# Patient Record
Sex: FEMALE | Race: OTHER | Employment: OTHER | URBAN - METROPOLITAN AREA
[De-identification: names, ages, dates, MRNs, and addresses within clinical notes are randomized per-mention and may not be internally consistent; named-entity substitution may affect disease eponyms.]

---

## 2017-04-02 ENCOUNTER — HOSPITAL ENCOUNTER (EMERGENCY)
Facility: HOSPITAL | Age: 77
Discharge: HOME | End: 2017-04-02
Attending: EMERGENCY MEDICINE
Payer: MEDICARE

## 2017-04-02 ENCOUNTER — APPOINTMENT (OUTPATIENT)
Dept: CT IMAGING | Facility: HOSPITAL | Age: 77
End: 2017-04-02
Attending: EMERGENCY MEDICINE
Payer: MEDICARE

## 2017-04-02 VITALS
TEMPERATURE: 96 F | SYSTOLIC BLOOD PRESSURE: 137 MMHG | HEART RATE: 70 BPM | DIASTOLIC BLOOD PRESSURE: 65 MMHG | RESPIRATION RATE: 18 BRPM | BODY MASS INDEX: 35.34 KG/M2 | WEIGHT: 180 LBS | HEIGHT: 60 IN | OXYGEN SATURATION: 90 %

## 2017-04-02 DIAGNOSIS — N20.1 RIGHT URETERAL STONE: Primary | ICD-10-CM

## 2017-04-02 PROCEDURE — 96374 THER/PROPH/DIAG INJ IV PUSH: CPT

## 2017-04-02 PROCEDURE — 80048 BASIC METABOLIC PNL TOTAL CA: CPT

## 2017-04-02 PROCEDURE — 81003 URINALYSIS AUTO W/O SCOPE: CPT | Performed by: EMERGENCY MEDICINE

## 2017-04-02 PROCEDURE — 80076 HEPATIC FUNCTION PANEL: CPT

## 2017-04-02 PROCEDURE — 85025 COMPLETE CBC W/AUTO DIFF WBC: CPT

## 2017-04-02 PROCEDURE — 96376 TX/PRO/DX INJ SAME DRUG ADON: CPT

## 2017-04-02 PROCEDURE — 99284 EMERGENCY DEPT VISIT MOD MDM: CPT

## 2017-04-02 PROCEDURE — 74177 CT ABD & PELVIS W/CONTRAST: CPT

## 2017-04-02 PROCEDURE — 83690 ASSAY OF LIPASE: CPT

## 2017-04-02 PROCEDURE — 96375 TX/PRO/DX INJ NEW DRUG ADDON: CPT

## 2017-04-02 RX ORDER — ONDANSETRON 2 MG/ML
4 INJECTION INTRAMUSCULAR; INTRAVENOUS ONCE
Status: COMPLETED | OUTPATIENT
Start: 2017-04-02 | End: 2017-04-02

## 2017-04-02 RX ORDER — ONDANSETRON 4 MG/1
4 TABLET, ORALLY DISINTEGRATING ORAL EVERY 4 HOURS PRN
Qty: 10 TABLET | Refills: 0 | Status: SHIPPED | OUTPATIENT
Start: 2017-04-02 | End: 2017-04-09

## 2017-04-02 RX ORDER — HYDROCODONE BITARTRATE AND ACETAMINOPHEN 5; 325 MG/1; MG/1
1-2 TABLET ORAL EVERY 4 HOURS PRN
Qty: 15 TABLET | Refills: 0 | Status: SHIPPED | OUTPATIENT
Start: 2017-04-02 | End: 2017-04-20

## 2017-04-02 RX ORDER — HYDROMORPHONE HYDROCHLORIDE 1 MG/ML
0.5 INJECTION, SOLUTION INTRAMUSCULAR; INTRAVENOUS; SUBCUTANEOUS ONCE
Status: COMPLETED | OUTPATIENT
Start: 2017-04-02 | End: 2017-04-02

## 2017-04-02 RX ORDER — HYDROMORPHONE HYDROCHLORIDE 1 MG/ML
1 INJECTION, SOLUTION INTRAMUSCULAR; INTRAVENOUS; SUBCUTANEOUS ONCE
Status: DISCONTINUED | OUTPATIENT
Start: 2017-04-02 | End: 2017-04-02

## 2017-04-02 RX ORDER — TAMSULOSIN HYDROCHLORIDE 0.4 MG/1
0.4 CAPSULE ORAL DAILY
Qty: 7 CAPSULE | Refills: 0 | Status: SHIPPED | OUTPATIENT
Start: 2017-04-02 | End: 2017-04-09

## 2017-04-03 NOTE — ED PROVIDER NOTES
Patient Seen in: [facility] AND [facility] Emergency Department    History   Patient presents with:  Abdomen/Flank Pain (GI/)    Stated Complaint:     HPI    70-year-old female with history of cholelithiasis and depression presents with complaints of right low systems are as noted in HPI. Constitutional and vital signs reviewed. All other systems reviewed and negative except as noted above. PSFH elements reviewed from today and agreed except as otherwise stated in HPI.     Physical Exam       ED Triage V DIFFERENTIAL WITH PLATELET.   Procedure                               Abnormality         Status                     ---------                               -----------         ------                     CBC W/ DIFFERENTIAL[[id number]] Refills: 0    !! HYDROcodone-acetaminophen 5-325 MG Oral Tab  Take 1-2 tablets by mouth every 4 (four) hours as needed. Qty: 15 tablet Refills: 0    !! - Potential duplicate medications found. Please discuss with provider.

## 2017-04-11 ENCOUNTER — OFFICE VISIT (OUTPATIENT)
Dept: FAMILY MEDICINE CLINIC | Facility: CLINIC | Age: 77
End: 2017-04-11

## 2017-04-11 VITALS
WEIGHT: 191 LBS | BODY MASS INDEX: 37 KG/M2 | SYSTOLIC BLOOD PRESSURE: 155 MMHG | HEART RATE: 65 BPM | DIASTOLIC BLOOD PRESSURE: 76 MMHG

## 2017-04-11 DIAGNOSIS — L60.0 INGROWN TOENAIL: Primary | ICD-10-CM

## 2017-04-11 PROCEDURE — 99212 OFFICE O/P EST SF 10 MIN: CPT | Performed by: FAMILY MEDICINE

## 2017-04-11 PROCEDURE — G0463 HOSPITAL OUTPT CLINIC VISIT: HCPCS | Performed by: FAMILY MEDICINE

## 2017-04-11 RX ORDER — MAG HYDROX/ALUMINUM HYD/SIMETH 400-400-40
SUSPENSION, ORAL (FINAL DOSE FORM) ORAL
COMMUNITY
Start: 2017-02-01

## 2017-04-11 RX ORDER — ONDANSETRON 4 MG/1
TABLET, ORALLY DISINTEGRATING ORAL
COMMUNITY
Start: 2017-04-02

## 2017-04-11 NOTE — PROGRESS NOTES
Blood pressure 155/76, pulse 65, weight 191 lb (86.637 kg). Patient presents today complaining of ingrown toenail of the right foot it is occasionally painful. No purulent discharge. Has been bothering her for a week.     Objective right hallux with onyc

## 2017-04-17 PROBLEM — E66.01 SEVERE OBESITY (BMI 35.0-39.9) WITH COMORBIDITY (HCC): Chronic | Status: ACTIVE | Noted: 2017-04-17

## 2017-04-20 ENCOUNTER — LAB ENCOUNTER (OUTPATIENT)
Dept: LAB | Age: 77
End: 2017-04-20
Attending: FAMILY MEDICINE
Payer: MEDICARE

## 2017-04-20 ENCOUNTER — OFFICE VISIT (OUTPATIENT)
Dept: FAMILY MEDICINE CLINIC | Facility: CLINIC | Age: 77
End: 2017-04-20

## 2017-04-20 VITALS
BODY MASS INDEX: 37 KG/M2 | DIASTOLIC BLOOD PRESSURE: 70 MMHG | HEART RATE: 74 BPM | WEIGHT: 190 LBS | SYSTOLIC BLOOD PRESSURE: 120 MMHG | TEMPERATURE: 98 F

## 2017-04-20 DIAGNOSIS — N20.0 NEPHROLITHIASIS: Primary | ICD-10-CM

## 2017-04-20 DIAGNOSIS — M85.80 OSTEOPENIA: ICD-10-CM

## 2017-04-20 DIAGNOSIS — R03.0 ELEVATED BLOOD PRESSURE READING: ICD-10-CM

## 2017-04-20 DIAGNOSIS — I10 ESSENTIAL HYPERTENSION: ICD-10-CM

## 2017-04-20 DIAGNOSIS — Z00.00 MEDICARE ANNUAL WELLNESS VISIT, INITIAL: ICD-10-CM

## 2017-04-20 DIAGNOSIS — N20.0 NEPHROLITHIASIS: ICD-10-CM

## 2017-04-20 PROCEDURE — 84443 ASSAY THYROID STIM HORMONE: CPT

## 2017-04-20 PROCEDURE — 36415 COLL VENOUS BLD VENIPUNCTURE: CPT

## 2017-04-20 PROCEDURE — 99213 OFFICE O/P EST LOW 20 MIN: CPT | Performed by: FAMILY MEDICINE

## 2017-04-20 PROCEDURE — 80061 LIPID PANEL: CPT

## 2017-04-20 PROCEDURE — 85025 COMPLETE CBC W/AUTO DIFF WBC: CPT

## 2017-04-20 PROCEDURE — 87086 URINE CULTURE/COLONY COUNT: CPT

## 2017-04-20 PROCEDURE — G0463 HOSPITAL OUTPT CLINIC VISIT: HCPCS | Performed by: FAMILY MEDICINE

## 2017-04-20 PROCEDURE — 80048 BASIC METABOLIC PNL TOTAL CA: CPT

## 2017-04-20 PROCEDURE — 82306 VITAMIN D 25 HYDROXY: CPT

## 2017-04-20 PROCEDURE — 81003 URINALYSIS AUTO W/O SCOPE: CPT

## 2017-04-20 NOTE — PROGRESS NOTES
Blood pressure 120/70, pulse 74, temperature 98 °F (36.7 °C), temperature source Oral, weight 190 lb (86.183 kg). Patient presents today following up for emergency department visit for nephrolithiasis.   Denies pain at this time she has no vomiting chills

## 2017-04-25 ENCOUNTER — OFFICE VISIT (OUTPATIENT)
Dept: PODIATRY CLINIC | Facility: CLINIC | Age: 77
End: 2017-04-25

## 2017-04-25 DIAGNOSIS — B35.1 ONYCHOMYCOSIS OF RIGHT GREAT TOE: Primary | ICD-10-CM

## 2017-04-25 PROCEDURE — 99203 OFFICE O/P NEW LOW 30 MIN: CPT

## 2017-04-25 NOTE — PROGRESS NOTES
HPI:    Patient ID: Lisbeth Prater is a 68year old female. HPI     Hx provided by patient's nephew. Fungal Nail  Patient presents for evaluation of right hallux fungal infection. Patient has had the symptoms for over 1 month.  Pt c/o pain on APPLY SCALP TO TOES LEAVE ON FOR 8-12 HOURS THEN [address] OFF Disp: 3 Tube Rfl: 0   TUMS OR prn Disp:  Rfl:    NASONEX 50 MCG/ACT NA SUSP 1 spray bilat  nares bid Disp: 1 Rfl: 0     Allergies:No Known Allergies   PHYSICAL EXAM:   Physical Exam   Constitutional month for further evaluation. No orders of the defined types were placed in this encounter.        Meds This Visit:  No prescriptions requested or ordered in this encounter    Imaging & Referrals:  None       ID#[id number]    By signing my name below, Kitty Young

## 2017-05-08 ENCOUNTER — OFFICE VISIT (OUTPATIENT)
Dept: SURGERY | Facility: CLINIC | Age: 77
End: 2017-05-08

## 2017-05-08 VITALS
TEMPERATURE: 98 F | DIASTOLIC BLOOD PRESSURE: 80 MMHG | HEART RATE: 65 BPM | HEIGHT: 63 IN | BODY MASS INDEX: 33.84 KG/M2 | WEIGHT: 191 LBS | RESPIRATION RATE: 16 BRPM | SYSTOLIC BLOOD PRESSURE: 160 MMHG

## 2017-05-08 DIAGNOSIS — N20.0 KIDNEY STONE: Primary | ICD-10-CM

## 2017-05-08 DIAGNOSIS — R82.90 URINE FINDING: ICD-10-CM

## 2017-05-08 PROCEDURE — 99204 OFFICE O/P NEW MOD 45 MIN: CPT | Performed by: UROLOGY

## 2017-05-08 PROCEDURE — G0463 HOSPITAL OUTPT CLINIC VISIT: HCPCS | Performed by: UROLOGY

## 2017-05-08 PROCEDURE — 81003 URINALYSIS AUTO W/O SCOPE: CPT | Performed by: UROLOGY

## 2017-05-08 NOTE — PROGRESS NOTES
SUBJECTIVE:  Darrius Shah is a 68year old female who presents for a consultation at the request of, and a copy of this note will be sent to, Dr. Leon Harris, for evaluation of  urinary stone. She states that the problem is unchanged.  Symptom three, pleasant and cooperative.   CARDIOVASCULAR:normal apical impulse  RESPIRATORY: no chest wall deformities or tenderness  ABDOMEN: abdomen is soft without significant tenderness, masses, organomegaly or guarding  Skin exam grossly normal.    ASSESSMENT

## 2017-05-10 ENCOUNTER — HOSPITAL ENCOUNTER (OUTPATIENT)
Dept: CT IMAGING | Facility: HOSPITAL | Age: 77
Discharge: HOME | End: 2017-05-10
Attending: UROLOGY
Payer: MEDICARE

## 2017-05-10 DIAGNOSIS — N20.0 KIDNEY STONE: ICD-10-CM

## 2017-05-10 PROCEDURE — 74176 CT ABD & PELVIS W/O CONTRAST: CPT | Performed by: UROLOGY

## 2017-05-30 ENCOUNTER — OFFICE VISIT (OUTPATIENT)
Dept: SURGERY | Facility: CLINIC | Age: 77
End: 2017-05-30

## 2017-05-30 VITALS
HEART RATE: 68 BPM | HEIGHT: 63 IN | BODY MASS INDEX: 33.84 KG/M2 | SYSTOLIC BLOOD PRESSURE: 119 MMHG | DIASTOLIC BLOOD PRESSURE: 82 MMHG | TEMPERATURE: 98 F | RESPIRATION RATE: 16 BRPM | WEIGHT: 191 LBS

## 2017-05-30 DIAGNOSIS — N20.0 KIDNEY STONE: Primary | ICD-10-CM

## 2017-05-30 PROCEDURE — G0463 HOSPITAL OUTPT CLINIC VISIT: HCPCS | Performed by: UROLOGY

## 2017-05-30 PROCEDURE — 99213 OFFICE O/P EST LOW 20 MIN: CPT | Performed by: UROLOGY

## 2017-05-30 NOTE — PROGRESS NOTES
Celia Feliciano is a 68year old female. HPI:   Patient presents with:  Kidney Problem: Denies flank pain, dysuria and gross hematuria  Other: Ji Jeffers #[id number]    80-year-old female presents in follow-up to a visit May 8, 2017.   [place] OFF Disp: 3 Tube Rfl: 0   TUMS OR prn Disp:  Rfl:    NASONEX 50 MCG/ACT NA SUSP 1 spray bilat  nares bid Disp: 1 Rfl: 0       Allergies:  No Known Allergies      ROS:       PHYSICAL EXAM:        ASSESSMENT/PLAN:   Assessment  Kidney stone  (primary encount

## 2017-06-21 ENCOUNTER — OFFICE VISIT (OUTPATIENT)
Dept: PODIATRY CLINIC | Facility: CLINIC | Age: 77
End: 2017-06-21

## 2017-06-21 DIAGNOSIS — B35.1 ONYCHOMYCOSIS OF RIGHT GREAT TOE: ICD-10-CM

## 2017-06-21 DIAGNOSIS — M79.674 PAIN OF TOE OF RIGHT FOOT: Primary | ICD-10-CM

## 2017-06-21 PROCEDURE — 11720 DEBRIDE NAIL 1-5: CPT | Performed by: PODIATRIST

## 2017-06-21 PROCEDURE — 99212 OFFICE O/P EST SF 10 MIN: CPT | Performed by: PODIATRIST

## 2017-06-21 NOTE — PROGRESS NOTES
HPI:    Patient ID: Simba Sidhu is a 68year old female. TONY  Is an 22-year-old female presents as a new patient to me. She was seen by Dr. Brett Oscar in the past.  She is accompanied by her daughter who assisted in translation.   The primary co for treatment options for this condition. After complete discussion careful and complete debridement of the nail was accomplished without incident. I reduced at least 50% of this nail. There is no open or draining apparent.   I discussed recurrence of th

## 2017-06-29 ENCOUNTER — TELEPHONE (OUTPATIENT)
Dept: FAMILY MEDICINE CLINIC | Facility: CLINIC | Age: 77
End: 2017-06-29

## 2017-06-29 ENCOUNTER — LAB ENCOUNTER (OUTPATIENT)
Dept: LAB | Age: 77
End: 2017-06-29
Attending: FAMILY MEDICINE
Payer: MEDICARE

## 2017-06-29 ENCOUNTER — APPOINTMENT (OUTPATIENT)
Dept: LAB | Age: 77
End: 2017-06-29
Attending: FAMILY MEDICINE
Payer: MEDICARE

## 2017-06-29 ENCOUNTER — OFFICE VISIT (OUTPATIENT)
Dept: FAMILY MEDICINE CLINIC | Facility: CLINIC | Age: 77
End: 2017-06-29

## 2017-06-29 VITALS
BODY MASS INDEX: 32.05 KG/M2 | HEIGHT: 59 IN | SYSTOLIC BLOOD PRESSURE: 136 MMHG | HEART RATE: 67 BPM | WEIGHT: 159 LBS | DIASTOLIC BLOOD PRESSURE: 60 MMHG

## 2017-06-29 DIAGNOSIS — Z12.31 SCREENING MAMMOGRAM, ENCOUNTER FOR: ICD-10-CM

## 2017-06-29 DIAGNOSIS — E66.9 OBESITY (BMI 35.0-39.9 WITHOUT COMORBIDITY): ICD-10-CM

## 2017-06-29 DIAGNOSIS — F32.89 OTHER DEPRESSION: ICD-10-CM

## 2017-06-29 DIAGNOSIS — E55.9 VITAMIN D DEFICIENCY: ICD-10-CM

## 2017-06-29 DIAGNOSIS — Z00.00 MEDICARE ANNUAL WELLNESS VISIT, SUBSEQUENT: ICD-10-CM

## 2017-06-29 DIAGNOSIS — Z00.00 MEDICARE ANNUAL WELLNESS VISIT, SUBSEQUENT: Primary | ICD-10-CM

## 2017-06-29 DIAGNOSIS — K80.20 GALLSTONES: ICD-10-CM

## 2017-06-29 DIAGNOSIS — Z00.00 ENCOUNTER FOR ANNUAL HEALTH EXAMINATION: ICD-10-CM

## 2017-06-29 DIAGNOSIS — M85.80 OSTEOPENIA, UNSPECIFIED LOCATION: ICD-10-CM

## 2017-06-29 PROBLEM — L60.0 INGROWN TOENAIL: Status: RESOLVED | Noted: 2017-04-11 | Resolved: 2017-06-29

## 2017-06-29 PROBLEM — F32.A DEPRESSION: Status: ACTIVE | Noted: 2017-06-29

## 2017-06-29 PROCEDURE — 93005 ELECTROCARDIOGRAM TRACING: CPT

## 2017-06-29 PROCEDURE — G0438 PPPS, INITIAL VISIT: HCPCS | Performed by: FAMILY MEDICINE

## 2017-06-29 PROCEDURE — 93010 ELECTROCARDIOGRAM REPORT: CPT | Performed by: FAMILY MEDICINE

## 2017-06-29 PROCEDURE — 82306 VITAMIN D 25 HYDROXY: CPT

## 2017-06-29 PROCEDURE — 36415 COLL VENOUS BLD VENIPUNCTURE: CPT

## 2017-06-29 RX ORDER — PHENOL 1.4 %
600 AEROSOL, SPRAY (ML) MUCOUS MEMBRANE 2 TIMES DAILY
Qty: 60 TABLET | Refills: 11 | Status: SHIPPED | OUTPATIENT
Start: 2017-06-29 | End: 2017-06-30

## 2017-06-29 RX ORDER — ERGOCALCIFEROL (VITAMIN D2) 1250 MCG
50000 CAPSULE ORAL WEEKLY
Qty: 4 CAPSULE | Refills: 3 | Status: SHIPPED | OUTPATIENT
Start: 2017-06-29 | End: 2017-07-29

## 2017-06-29 NOTE — TELEPHONE ENCOUNTER
Pharm is calling need to clarify direction on medication Calcium Carbonate (CALCIUM 600) 600 MG Oral Tab

## 2017-06-29 NOTE — PATIENT INSTRUCTIONS
Alis Carter's SCREENING SCHEDULE   Tests on this list are recommended by your physician but may not be covered, or covered at this frequency, by your insurer. Please check with your insurance carrier before scheduling to verify coverage.    PRE Anyone with a family history    Colorectal Cancer Screening  Covered up to Age 76     Colonoscopy Screen   Covered every 10 years- more often if abnormal Colonoscopy,10 Years due on 05/08/1990 Update Health Maintenance if applicable    Flex Sigmoidoscopy S Pneumococcal 13 (Prevnar)  Covered Once after 65 No orders found for this or any previous visit. Please get once after your 65th birthday    Pneumococcal 23 (Pneumovax)  Covered Once after 65 No orders found for this or any previous visit.  Please get once

## 2017-06-29 NOTE — PROGRESS NOTES
HPI:   Marylou Tracey is a 68year old female who presents for a Medicare Initial Annual Wellness visit (Once after 12 month Medicare anniversery) .         Annual Physical due on 12/01/2017        Patient Care Team: Patient Care Team:  Seth Candelaria permethrin (ELIMITE) 5 % Apply Externally Cream APPLY SCALP TO TOES LEAVE ON FOR 8-12 HOURS THEN WASH OFF   TUMS OR prn   NASONEX 50 MCG/ACT NA SUSP 1 spray bilat  nares bid      MEDICAL INFORMATION:   She  has a past medical history of Depression and [place] have to worry about missing the telephone ring or doorbell:  No I have trouble hearing conversations in a noisy background such as a crowded room or restaurant:  No   I get confused about where sounds come from:  No I misunderstand some words in a sentence normal, no rashes or lesions   Lymph nodes: Cervical, supraclavicular, and axillary nodes normal   Neurologic: Normal         SUGGESTED VACCINATIONS - Influenza, Pneumococcal, Zoster, Tetanus     Immunization History   Administered Date(s) Administered   • DO Yadi, 6/29/2017     General Health     In the past six months, have you lost more than 10 pounds without trying?: 2 - No    Has your appetite been poor?: No    How does the patient maintain a good energy level?: Daily Walks    How would you describ Advance Directives     Do you have a healthcare power of ?: No    Do you have a living will?: Yes     Please go to \"Cognitive Assessment\" under Medicare Assessment section in Charting, test patient and document.     Then, refresh your progress reminders to display for this patient. Update Health Maintenance if applicable    Chlamydia  Annually if high risk No results found for: CHLAMYDIA No flowsheet data found.     Screening Mammogram      Mammogram Annually to 76, then as discussed Coretta Herrera Annually No results found for: A1C No flowsheet data found. Creat/alb ratio  Annually      LDL  Annually LDL Cholesterol (mg/dL)   Date Value   04/20/2017 103 (H)   04/13/2013 100 (H)    No flowsheet data found.      Dilated Eye exam  Annually No flowshe

## 2017-06-30 LAB — 25(OH)D3 SERPL-MCNC: 13.3 NG/ML

## 2017-06-30 RX ORDER — PHENOL 1.4 %
AEROSOL, SPRAY (ML) MUCOUS MEMBRANE
Qty: 120 TABLET | Refills: 11 | Status: SHIPPED | OUTPATIENT
Start: 2017-06-30

## 2017-06-30 NOTE — TELEPHONE ENCOUNTER
Please advise on medication request for calcium tablets. Do you want 4 tablets and not . 4 tablets    I pended as 4 tablets with quantity of 120.     Take 0.4 tablets (600 mg total) by mouth 2 (two) times daily.

## 2017-07-05 ENCOUNTER — HOSPITAL ENCOUNTER (OUTPATIENT)
Dept: MAMMOGRAPHY | Age: 77
Discharge: HOME | End: 2017-07-05
Attending: FAMILY MEDICINE
Payer: MEDICARE

## 2017-07-05 DIAGNOSIS — Z12.31 SCREENING MAMMOGRAM, ENCOUNTER FOR: ICD-10-CM

## 2017-07-05 PROCEDURE — 77067 SCR MAMMO BI INCL CAD: CPT | Performed by: FAMILY MEDICINE

## 2018-04-19 ENCOUNTER — OFFICE VISIT (OUTPATIENT)
Dept: FAMILY MEDICINE CLINIC | Facility: CLINIC | Age: 78
End: 2018-04-19

## 2018-04-19 VITALS
BODY MASS INDEX: 40 KG/M2 | SYSTOLIC BLOOD PRESSURE: 152 MMHG | DIASTOLIC BLOOD PRESSURE: 76 MMHG | HEART RATE: 66 BPM | WEIGHT: 196.38 LBS

## 2018-04-19 DIAGNOSIS — L60.0 INGROWN NAIL: Primary | ICD-10-CM

## 2018-04-19 PROCEDURE — 99212 OFFICE O/P EST SF 10 MIN: CPT | Performed by: FAMILY MEDICINE

## 2018-04-19 NOTE — PROGRESS NOTES
Blood pressure 152/76, pulse 66, weight 196 lb 6 oz (89.1 kg). Patient presents today complaining of left ingrown nail of the hallux. It is painful she would like it removed.     Objective left hallux dystrophic noted minimal tenderness no erythema no d

## 2018-05-07 ENCOUNTER — OFFICE VISIT (OUTPATIENT)
Dept: PODIATRY CLINIC | Facility: CLINIC | Age: 78
End: 2018-05-07

## 2018-05-07 DIAGNOSIS — M79.674 PAIN OF TOE OF RIGHT FOOT: Primary | ICD-10-CM

## 2018-05-07 DIAGNOSIS — B35.1 ONYCHOMYCOSIS OF RIGHT GREAT TOE: ICD-10-CM

## 2018-05-07 PROCEDURE — 99212 OFFICE O/P EST SF 10 MIN: CPT | Performed by: PODIATRIST

## 2018-05-07 NOTE — PROGRESS NOTES
HPI:    Patient ID: Alli Walton is a 68year old female. HPI  44-year-old female presents with recurrent pain associated with the right great toe. I saw this patient in June 2017 with the same complaint.   She is accompanied today by a friend w impression now that they would like me to remove this on a permanent basis. Patient is leaving for vacation to [facility] in the next few weeks and I encourage her to have it done when she returns.   They indicated understanding and will follow-up as appropria

## 2018-05-18 ENCOUNTER — TELEPHONE (OUTPATIENT)
Dept: FAMILY MEDICINE CLINIC | Facility: CLINIC | Age: 78
End: 2018-05-18

## 2018-05-18 NOTE — TELEPHONE ENCOUNTER
Per Olaf Acuña they received a script a calcium carbonate but they does not come in plain it comes with vitamin D 400. Is it ok to dispense with the vitamin D in it?        Current Outpatient Prescriptions:  Calcium Carbonate (CALCIUM 600) 600 MG Oral Tab Take b

## 2018-05-21 NOTE — TELEPHONE ENCOUNTER
Left message on [employer] VM informing ok to dispense calcium with Vitamin D 400 as per DBC's response below.

## 2018-11-21 ENCOUNTER — HOSPITAL ENCOUNTER (EMERGENCY)
Facility: HOSPITAL | Age: 78
Discharge: HOME | End: 2018-11-21
Attending: EMERGENCY MEDICINE
Payer: MEDICARE

## 2018-11-21 ENCOUNTER — OFFICE VISIT (OUTPATIENT)
Dept: FAMILY MEDICINE CLINIC | Facility: CLINIC | Age: 78
End: 2018-11-21
Payer: MEDICARE

## 2018-11-21 ENCOUNTER — APPOINTMENT (OUTPATIENT)
Dept: CT IMAGING | Facility: HOSPITAL | Age: 78
End: 2018-11-21
Attending: EMERGENCY MEDICINE
Payer: MEDICARE

## 2018-11-21 VITALS
RESPIRATION RATE: 18 BRPM | TEMPERATURE: 98 F | WEIGHT: 183 LBS | BODY MASS INDEX: 37 KG/M2 | OXYGEN SATURATION: 96 % | HEART RATE: 68 BPM | SYSTOLIC BLOOD PRESSURE: 135 MMHG | DIASTOLIC BLOOD PRESSURE: 68 MMHG

## 2018-11-21 VITALS
BODY MASS INDEX: 37.09 KG/M2 | HEIGHT: 59 IN | HEART RATE: 75 BPM | SYSTOLIC BLOOD PRESSURE: 121 MMHG | DIASTOLIC BLOOD PRESSURE: 67 MMHG | WEIGHT: 184 LBS

## 2018-11-21 DIAGNOSIS — R10.84 GENERALIZED ABDOMINAL PAIN: Primary | ICD-10-CM

## 2018-11-21 DIAGNOSIS — K20.90 ESOPHAGITIS: ICD-10-CM

## 2018-11-21 DIAGNOSIS — R10.9 ABDOMINAL PAIN, ACUTE: Primary | ICD-10-CM

## 2018-11-21 PROCEDURE — 85025 COMPLETE CBC W/AUTO DIFF WBC: CPT

## 2018-11-21 PROCEDURE — 74176 CT ABD & PELVIS W/O CONTRAST: CPT | Performed by: EMERGENCY MEDICINE

## 2018-11-21 PROCEDURE — 85025 COMPLETE CBC W/AUTO DIFF WBC: CPT | Performed by: EMERGENCY MEDICINE

## 2018-11-21 PROCEDURE — 96375 TX/PRO/DX INJ NEW DRUG ADDON: CPT

## 2018-11-21 PROCEDURE — 99285 EMERGENCY DEPT VISIT HI MDM: CPT

## 2018-11-21 PROCEDURE — S0028 INJECTION, FAMOTIDINE, 20 MG: HCPCS | Performed by: EMERGENCY MEDICINE

## 2018-11-21 PROCEDURE — 99213 OFFICE O/P EST LOW 20 MIN: CPT | Performed by: NURSE PRACTITIONER

## 2018-11-21 PROCEDURE — 81001 URINALYSIS AUTO W/SCOPE: CPT | Performed by: EMERGENCY MEDICINE

## 2018-11-21 PROCEDURE — 80048 BASIC METABOLIC PNL TOTAL CA: CPT | Performed by: EMERGENCY MEDICINE

## 2018-11-21 PROCEDURE — 96374 THER/PROPH/DIAG INJ IV PUSH: CPT

## 2018-11-21 PROCEDURE — 80048 BASIC METABOLIC PNL TOTAL CA: CPT

## 2018-11-21 RX ORDER — ONDANSETRON 2 MG/ML
4 INJECTION INTRAMUSCULAR; INTRAVENOUS ONCE
Status: COMPLETED | OUTPATIENT
Start: 2018-11-21 | End: 2018-11-21

## 2018-11-21 RX ORDER — FAMOTIDINE 10 MG/ML
20 INJECTION, SOLUTION INTRAVENOUS ONCE
Status: COMPLETED | OUTPATIENT
Start: 2018-11-21 | End: 2018-11-21

## 2018-11-21 RX ORDER — MORPHINE SULFATE 4 MG/ML
2 INJECTION, SOLUTION INTRAMUSCULAR; INTRAVENOUS ONCE
Status: COMPLETED | OUTPATIENT
Start: 2018-11-21 | End: 2018-11-21

## 2018-11-21 NOTE — ED PROVIDER NOTES
Patient Seen in: [facility] AND [facility] Emergency Department    History   Patient presents with:  Abdomen/Flank Pain (GI/)    Stated Complaint: right side flank pain     HPI    Patient presents with concerns of possible passing a kidney stone.   She has had HPI.  Constitutional and vital signs reviewed. All other systems reviewed and negative except as noted above. PSFH elements reviewed from today and agreed except as otherwise stated in HPI.     Physical Exam     ED Triage Vitals [11/21/18 3267]   BP BASIC METABOLIC PANEL (8) - Abnormal; Notable for the following components:       Result Value    Glucose 121 (*)     All other components within normal limits   URINALYSIS WITH CULTURE REFLEX - Abnormal; Notable for the following components:    Clarity

## 2018-11-21 NOTE — ASSESSMENT & PLAN NOTE
Discussed w pt via interpretor that further evaluation is needed for abd distension and pain-advised to go to ER and will go there now with .      Triage report given to [facility] MARYAM SELLERS triage RN

## 2018-11-21 NOTE — PROGRESS NOTES
HPI  Pt here for right mid and low back pain for past couple of days. Pt states it is across whole lower back and radiates around to stomach. Denies n/v/d  Is constipated. Last moved bowels 3 days ago.   Was having bad abd pain, so bad she was unable to e medical: Not on file      Transportation needs - non-medical: Not on file    Occupational History      Not on file    Tobacco Use      Smoking status: Never Smoker      Smokeless tobacco: Never Used    Substance and Sexual Activity      Alcohol use:  No Psychiatric: She has a normal mood and affect.  Her behavior is normal.       Assessment and Plan:  Problem List Items Addressed This Visit        Other    Generalized abdominal pain - Primary     Discussed w pt via interpretor that further evaluation is

## 2018-11-22 NOTE — ED NOTES
Patient's spo2 dropped intermittently to low high 80s on RA. Up to 97% with deep breaths. Denies complaints of dyspnea. Dr. Rowena Tan made aware. No further intervention. Patient discharged in NAD.

## 2019-01-10 ENCOUNTER — OFFICE VISIT (OUTPATIENT)
Dept: PODIATRY CLINIC | Facility: CLINIC | Age: 79
End: 2019-01-10
Payer: MEDICARE

## 2019-01-10 DIAGNOSIS — M79.674 PAIN OF TOE OF RIGHT FOOT: Primary | ICD-10-CM

## 2019-01-10 DIAGNOSIS — B35.1 ONYCHOMYCOSIS OF RIGHT GREAT TOE: ICD-10-CM

## 2019-01-10 PROCEDURE — 11720 DEBRIDE NAIL 1-5: CPT | Performed by: PODIATRIST

## 2019-01-10 NOTE — PROGRESS NOTES
HPI:    Patient ID: Jose Guadalupe Caballero is a 66year old female. This 40-year-old female presents with her son with a chief concern associated with the right great toenail.   The last time I saw this patient there was a degree of infection and it was extr Prescriptions      No prescriptions requested or ordered in this encounter       Imaging & Referrals:  None       #[id number]

## 2019-02-13 NOTE — MR AVS SNAPSHOT
Anesthesia ROS/Med Hx    Overall Review:  Pts. 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 Pt. positive for COPD     Neuro/Psych Review:    Pt. positive for psychiatric history    Cardiovascular Review:    Pt. positive for hypertension  Pt. positive for hyperlipidemia    GI/HEPATIC/RENAL Review:    Pt. positive for GERD    End/Other Review:    Pt. positive for diabetes      Anesthesia Plan     ASA Status: 3  Anesthesia Type: General  Induction: Intravenous  Preferred Airway Type: LMA  Reviewed: Lab Results, Nursing Notes, Pre-Induction Reassessment, Beta Blocker Status, NPO Status, Medications, Past Med History, Allergies, Problem List, Patient Summary, Consultations and EKG  The proposed anesthetic plan, including its risks and benefits, have been discussed with the Patient - along with the risks and benefits of alternatives. Questions were encouraged and answered and the patient and/or representative agrees to proceed. Plan Comments: R/B of general anesthesia/MAC/peripheral nerve blocks discussed with patient/patient's representative including but not limited to prolonged nerve blockade, cardiac complications, respiratory complications, CNS complications, nausea and vomiting, sore throat, and dental injury. Questions answered and patient wishes to proceed.         Physical Exam  Mallampati: I  TM Distance: >3 FB  Neck ROM: Full  cardiovascular exam normal  pulmonary exam normal  abdominal exam normal  dental exam normal [address], [address]  [address] [facility]  [phone]               Thank you for choosing us for your health care visit with Rhonda Grullon DO.   We are glad to serve you and happy to provide you with this sum [place]   [address] [address]   Phone:  [phone]   Fax:  [phone]         Referral Orders      Normal Orders This Visit    UROLOGY - INTERNAL [[id number] CUSTOM]  Order #:  [id number]         **REFERRAL REQUEST**    Your physician has referred you to TAKE 5 TABS WEEKLY FOR 3 WEEKS           LUBRICANT EYE DROPS 0.4-0.3 % Soln   Generic drug:  Polyethyl Glycol-Propyl Glycol           MEDICATED CHEST RUB 4.73-1.2-2.6 % Oint           NASONEX 50 MCG/ACT Susp   Generic drug:  Mometasone Furoate   1 spray bi

## 2019-11-07 ENCOUNTER — OFFICE VISIT (OUTPATIENT)
Dept: FAMILY MEDICINE CLINIC | Facility: CLINIC | Age: 79
End: 2019-11-07
Payer: MEDICARE

## 2019-11-07 VITALS
HEIGHT: 59 IN | BODY MASS INDEX: 38.91 KG/M2 | WEIGHT: 193 LBS | HEART RATE: 62 BPM | SYSTOLIC BLOOD PRESSURE: 134 MMHG | DIASTOLIC BLOOD PRESSURE: 75 MMHG

## 2019-11-07 DIAGNOSIS — E55.9 VITAMIN D DEFICIENCY: ICD-10-CM

## 2019-11-07 DIAGNOSIS — E78.1 HYPERTRIGLYCERIDEMIA: ICD-10-CM

## 2019-11-07 DIAGNOSIS — E66.01 SEVERE OBESITY (BMI 35.0-39.9) WITH COMORBIDITY (HCC): ICD-10-CM

## 2019-11-07 DIAGNOSIS — F32.A DEPRESSION, UNSPECIFIED DEPRESSION TYPE: ICD-10-CM

## 2019-11-07 DIAGNOSIS — Z12.31 SCREENING MAMMOGRAM, ENCOUNTER FOR: ICD-10-CM

## 2019-11-07 DIAGNOSIS — Z00.00 ENCOUNTER FOR ANNUAL HEALTH EXAMINATION: ICD-10-CM

## 2019-11-07 DIAGNOSIS — M85.80 OSTEOPENIA, UNSPECIFIED LOCATION: ICD-10-CM

## 2019-11-07 DIAGNOSIS — Z00.00 MEDICARE ANNUAL WELLNESS VISIT, SUBSEQUENT: Primary | ICD-10-CM

## 2019-11-07 PROCEDURE — G0009 ADMIN PNEUMOCOCCAL VACCINE: HCPCS | Performed by: FAMILY MEDICINE

## 2019-11-07 PROCEDURE — G0439 PPPS, SUBSEQ VISIT: HCPCS | Performed by: FAMILY MEDICINE

## 2019-11-07 PROCEDURE — 99397 PER PM REEVAL EST PAT 65+ YR: CPT | Performed by: FAMILY MEDICINE

## 2019-11-07 PROCEDURE — 96160 PT-FOCUSED HLTH RISK ASSMT: CPT | Performed by: FAMILY MEDICINE

## 2019-11-07 PROCEDURE — 90670 PCV13 VACCINE IM: CPT | Performed by: FAMILY MEDICINE

## 2019-11-07 PROCEDURE — G0008 ADMIN INFLUENZA VIRUS VAC: HCPCS | Performed by: FAMILY MEDICINE

## 2019-11-07 PROCEDURE — 90662 IIV NO PRSV INCREASED AG IM: CPT | Performed by: FAMILY MEDICINE

## 2019-11-07 NOTE — PATIENT INSTRUCTIONS
Instrucciones médicas anticipadas    Un formulario de instrucciones médicas anticipadas le permite a usted planear con anticipación el nivel de atención médica que quisiera recibir en pete de no poder expresar lo que desea.  Esta declaración describe el t · Entra en efecto solamente cuando usted ya no puede expresar jania deseos. Date Last Reviewed: 3/1/2018  © 4159-3825 [employer] [address]. [address] [facility]. Todos los derechos reservados.  Esta información no pretende sustituir ? Los hombres pueden mantener un orinal portátil cerca de la cama  · Manténgase tan activo mehdi pueda. Un buen equilibrio, flexibilidad, fortaleza y resistencia son producto del ejercicio, y todos juegan un papel importante en la prevención de las caídas. Cholesterol, covered every 5 yrs including Total, LDL and Trigs LDL Cholesterol (mg/dL)   Date Value   04/20/2017 103 (H)   04/13/2013 100 (H)     Cholesterol, Total (mg/dL)   Date Value   04/20/2017 189   04/13/2013 158     Triglycerides (mg/dL)   Date V Biennial benefit unless patient has history of long-term glucocorticoid use for medical condition    Last Dexa Scan:   XR DEXA BONE DENSITOMETRY (CPT=77080) 05/07/2016    No flowsheet data found.     Recommended for 2 year anniversary or as ordered in After Tetanus Toxoid- Only covered with a cut with metal- TD and TDaP Not covered by Medicare Part B) No orders found for this or any previous visit.  This may be covered with your prescription benefits, but Medicare does not cover unless Medically needed    Zos

## 2019-11-07 NOTE — PROGRESS NOTES
HPI:   Nigel Yanes is a 78year old female who presents for a Medicare Subsequent Annual Wellness visit (Pt already had Initial Annual Wellness). ALSO FOLLOWING UP FOR OSTEOPENIA AND HYPERTRIGLYCERIDEMIA WITH VITAMIN D DEFICIENCY.  REMOTE HISTOR She has never smoked tobacco.    CAGE Alcohol screening   Polina Bedolla was screened for Alcohol abuse and had a score of 0 so is at low risk.     Patient Care Team: Patient Care Team:  Luisa Alcazar DO as PCP - General (Family Medicine)    Pa REVIEW OF SYSTEMS:   GENERAL: feels well otherwise  SKIN: denies any unusual skin lesions  EYES: denies blurred vision or double vision  HEENT: denies nasal congestion, sinus pain or ST  LUNGS: denies shortness of breath with exertion  CARDIOVASCULAR: misunderstand what others are saying and make inappropriate responses:  No I avoid social activities because I cannot hear well and fear I will reply improperly:  No   Family members and friends have told me they think I may have hearing loss:   No Diagnoses and all orders for this visit:    Medicare annual wellness visit, subsequent  -     EKG 12-LEAD  -     OPTOMETRY - INTERNAL    Osteopenia, unspecified location  -     XR DEXA BONE DENSITOMETRY (CPT=77080); Future    Severe obesity (BMI 35.0-39. Screening      Colonoscopy Screen every 10 years There are no preventive care reminders to display for this patient. Update Health Maintenance if applicable    Flex Sigmoidoscopy Screen every 10 years No results found for this or any previous visit.  No sharyn with your prescription benefits, but Medicare does not cover unless Medically needed    Zoster  Not covered by Medicare Part B No vaccine history found This may be covered with your pharmacy  prescription benefits       1.  Medicare annual wellness visit, s

## 2019-11-08 ENCOUNTER — TELEPHONE (OUTPATIENT)
Dept: FAMILY MEDICINE CLINIC | Facility: CLINIC | Age: 79
End: 2019-11-08

## 2019-11-08 ENCOUNTER — APPOINTMENT (OUTPATIENT)
Dept: LAB | Age: 79
End: 2019-11-08
Attending: FAMILY MEDICINE
Payer: MEDICARE

## 2019-11-08 ENCOUNTER — LAB ENCOUNTER (OUTPATIENT)
Dept: LAB | Age: 79
End: 2019-11-08
Attending: FAMILY MEDICINE
Payer: MEDICARE

## 2019-11-08 DIAGNOSIS — Z00.00 MEDICARE ANNUAL WELLNESS VISIT, SUBSEQUENT: ICD-10-CM

## 2019-11-08 DIAGNOSIS — E03.9 HYPOTHYROIDISM, UNSPECIFIED TYPE: ICD-10-CM

## 2019-11-08 DIAGNOSIS — E78.1 HYPERTRIGLYCERIDEMIA: ICD-10-CM

## 2019-11-08 DIAGNOSIS — E55.9 VITAMIN D DEFICIENCY: ICD-10-CM

## 2019-11-08 DIAGNOSIS — Z00.00 MEDICARE ANNUAL WELLNESS VISIT, INITIAL: Primary | ICD-10-CM

## 2019-11-08 DIAGNOSIS — E03.8 OTHER SPECIFIED HYPOTHYROIDISM: Primary | ICD-10-CM

## 2019-11-08 PROCEDURE — 36415 COLL VENOUS BLD VENIPUNCTURE: CPT

## 2019-11-08 PROCEDURE — 84443 ASSAY THYROID STIM HORMONE: CPT

## 2019-11-08 PROCEDURE — 80061 LIPID PANEL: CPT

## 2019-11-08 PROCEDURE — 80053 COMPREHEN METABOLIC PANEL: CPT

## 2019-11-08 PROCEDURE — 84439 ASSAY OF FREE THYROXINE: CPT

## 2019-11-08 PROCEDURE — 82306 VITAMIN D 25 HYDROXY: CPT

## 2019-11-08 PROCEDURE — 93005 ELECTROCARDIOGRAM TRACING: CPT

## 2019-11-08 PROCEDURE — 93010 ELECTROCARDIOGRAM REPORT: CPT | Performed by: FAMILY MEDICINE

## 2019-11-08 RX ORDER — ERGOCALCIFEROL 1.25 MG/1
50000 CAPSULE ORAL WEEKLY
Qty: 12 CAPSULE | Refills: 0 | Status: SHIPPED | OUTPATIENT
Start: 2019-11-08 | End: 2020-02-06

## 2019-11-09 ENCOUNTER — APPOINTMENT (OUTPATIENT)
Dept: LAB | Age: 79
End: 2019-11-09
Attending: FAMILY MEDICINE
Payer: MEDICARE

## 2019-11-09 DIAGNOSIS — E03.9 HYPOTHYROIDISM, UNSPECIFIED TYPE: ICD-10-CM

## 2019-11-09 PROCEDURE — 86800 THYROGLOBULIN ANTIBODY: CPT

## 2019-11-09 PROCEDURE — 36415 COLL VENOUS BLD VENIPUNCTURE: CPT

## 2019-11-12 ENCOUNTER — TELEPHONE (OUTPATIENT)
Dept: FAMILY MEDICINE CLINIC | Facility: CLINIC | Age: 79
End: 2019-11-12

## 2019-11-12 NOTE — TELEPHONE ENCOUNTER
----- Message from Beny Raymond DO sent at 11/11/2019  6:03 PM CST -----  Labs show hypothyroidism. Start levothyroxine rx sent. Also patient to have fu blood test in 2 months to check thyroid.

## 2019-12-07 ENCOUNTER — HOSPITAL ENCOUNTER (OUTPATIENT)
Dept: MAMMOGRAPHY | Age: 79
Discharge: HOME | End: 2019-12-07
Attending: FAMILY MEDICINE
Payer: MEDICARE

## 2019-12-07 ENCOUNTER — HOSPITAL ENCOUNTER (OUTPATIENT)
Dept: BONE DENSITY | Age: 79
Discharge: HOME | End: 2019-12-07
Attending: FAMILY MEDICINE
Payer: MEDICARE

## 2019-12-07 DIAGNOSIS — Z12.31 SCREENING MAMMOGRAM, ENCOUNTER FOR: ICD-10-CM

## 2019-12-07 DIAGNOSIS — M85.80 OSTEOPENIA, UNSPECIFIED LOCATION: ICD-10-CM

## 2019-12-07 PROCEDURE — 77063 BREAST TOMOSYNTHESIS BI: CPT | Performed by: FAMILY MEDICINE

## 2019-12-07 PROCEDURE — 77067 SCR MAMMO BI INCL CAD: CPT | Performed by: FAMILY MEDICINE

## 2019-12-07 PROCEDURE — 77080 DXA BONE DENSITY AXIAL: CPT | Performed by: FAMILY MEDICINE

## 2019-12-11 NOTE — PROGRESS NOTES
With Language Line Rick#[id number],verified name and , advised Dr Senia Callahan note and verbalized understanding.       Notes recorded by Jensen Craven DO on 12/10/2019 at 5:59 PM CST  Osteopenia on bone density.  Calcium 1200mg daily and continue vitam

## 2019-12-12 ENCOUNTER — OFFICE VISIT (OUTPATIENT)
Dept: OPTOMETRY | Facility: CLINIC | Age: 79
End: 2019-12-12
Payer: MEDICARE

## 2019-12-12 DIAGNOSIS — H52.4 PRESBYOPIA: ICD-10-CM

## 2019-12-12 DIAGNOSIS — H25.13 AGE-RELATED NUCLEAR CATARACT OF BOTH EYES: Primary | ICD-10-CM

## 2019-12-12 PROCEDURE — 92004 COMPRE OPH EXAM NEW PT 1/>: CPT | Performed by: OPTOMETRIST

## 2019-12-12 NOTE — PROGRESS NOTES
Danielle Fox is a 78year old female. HPI:     HPI     Patient was referred by Dr. Selwyn Rebolledo for an annual wellness EE. It has been a long time since she has had an EE. Her grandson is here to help translate.  Patient does not currently wear glass Visual Acuity (Snellen - Linear)       Right Left Both    Dist sc 20/25 20/25 20/20    Near cc 4pt 4pt     Correction:  Glasses          Tonometry (Non-contact air puff, 3:33 PM)       Right Left    Pressure 14 14          Pupils       Pupils    Right P Sznandini, OD

## 2019-12-12 NOTE — PROGRESS NOTES
Wendi Norris is a 78year old female. HPI:     HPI     Patient was referred by Dr. Viridiana De La Torre for an annual wellness EE. It has been a long time since she has had an EE. Her grandson is here to help translate.  Patient does not currently wear glass Visual Acuity (Snellen - Linear)       Right Left Both    Dist sc 20/25 20/25 20/20    Near cc 4pt 4pt     Correction:  Glasses   Near vision is with +2.50 OTC reading glasses.            Tonometry (Non-contact air puff, 3:33 PM)       Right Left    Pres for Eye Exam.    12/12/2019  Scribed by: Chuck Schreiber

## 2019-12-12 NOTE — PATIENT INSTRUCTIONS
Age-related nuclear cataract of both eyes  No treatment is required. Will continue to observe. Presbyopia  Patient may use +2.50 as needed for reading and close work.

## 2019-12-16 RX ORDER — LEVOTHYROXINE SODIUM 0.03 MG/1
TABLET ORAL
Qty: 90 TABLET | Refills: 1 | OUTPATIENT
Start: 2019-12-16

## 2020-02-12 RX ORDER — LEVOTHYROXINE SODIUM 0.03 MG/1
TABLET ORAL
Qty: 90 TABLET | Refills: 0 | Status: SHIPPED | OUTPATIENT
Start: 2020-02-12 | End: 2020-06-27

## 2020-02-12 NOTE — TELEPHONE ENCOUNTER
Please review; protocol failed.  D/t labs  Requested Prescriptions     Pending Prescriptions Disp Refills   • LEVOTHYROXINE SODIUM 25 MCG Oral Tab [Pharmacy Med Name: LEVOTHYROXINE 0.025MG (25MCG) TAB] 30 tablet 2     Sig: TAKE 1 TABLET BY MOUTH DAILY BEFOR

## 2020-06-23 ENCOUNTER — TELEPHONE (OUTPATIENT)
Dept: FAMILY MEDICINE CLINIC | Facility: CLINIC | Age: 80
End: 2020-06-23

## 2020-06-23 NOTE — TELEPHONE ENCOUNTER
Per pharmacy pt is requesting refill for the following medication.  Please advise      •  LEVOTHYROXINE SODIUM 25 MCG Oral Tab, TAKE 1 TABLET BY MOUTH DAILY BEFORE BREAKFAST, Disp: 90 tablet, Rfl: 0

## 2020-06-27 RX ORDER — LEVOTHYROXINE SODIUM 0.03 MG/1
TABLET ORAL
Qty: 90 TABLET | Refills: 1 | Status: SHIPPED | OUTPATIENT
Start: 2020-06-27 | End: 2021-10-05

## 2020-11-17 ENCOUNTER — VIRTUAL PHONE E/M (OUTPATIENT)
Dept: FAMILY MEDICINE CLINIC | Facility: CLINIC | Age: 80
End: 2020-11-17
Payer: MEDICARE

## 2020-11-17 DIAGNOSIS — Z20.822 EXPOSURE TO COVID-19 VIRUS: Primary | ICD-10-CM

## 2020-11-17 DIAGNOSIS — J06.9 VIRAL UPPER RESPIRATORY TRACT INFECTION: ICD-10-CM

## 2020-11-17 PROCEDURE — 99441 PHONE E/M BY PHYS 5-10 MIN: CPT | Performed by: FAMILY MEDICINE

## 2020-11-17 NOTE — PROGRESS NOTES
Mariam Reed verbally consents to a Virtual/Telephone Check-In service on 11/17/20. Duration of Service:  5 minutes    Patient has been referred to the [facility] website at [email or web address] to review the yearly Consent to Treat document.     P There were no vitals taken for this visit. Physical Exam  Pulmonary:      Effort: Pulmonary effort is normal.   Neurological:      Mental Status: She is alert and oriented to person, place, and time.    Psychiatric:         Mood and Affect: Mood normal

## 2020-11-18 ENCOUNTER — APPOINTMENT (OUTPATIENT)
Dept: LAB | Age: 80
End: 2020-11-18
Attending: FAMILY MEDICINE
Payer: MEDICARE

## 2020-11-18 DIAGNOSIS — J06.9 VIRAL UPPER RESPIRATORY TRACT INFECTION: ICD-10-CM

## 2020-11-18 DIAGNOSIS — Z20.822 EXPOSURE TO COVID-19 VIRUS: ICD-10-CM

## 2020-11-30 ENCOUNTER — TELEPHONE (OUTPATIENT)
Dept: FAMILY MEDICINE CLINIC | Facility: CLINIC | Age: 80
End: 2020-11-30

## 2020-11-30 NOTE — TELEPHONE ENCOUNTER
Agree wit mucinex, she should increase water intake and use of humidifier, she also can use nasal saline if she has noticed congestion and or/sinus pressure, in the context for URI it is usually related to posterior drip from sinus.

## 2020-11-30 NOTE — TELEPHONE ENCOUNTER
Dr Chico Wharton (on behalf of Dr Grullon)= any recommendation about frequently \"clearing throat\". .    RN=if there is no recommendation, no need to call the patient and can close the encounter.       Please reply to pool: ALEJA SCHWARTZ [address]

## 2020-12-01 NOTE — TELEPHONE ENCOUNTER
Patient informed of provider's response/recommendations below and voiced understating but states she did not notice she was clearing her throat a lot. Pt agrees to call the office if any worsening in symptoms.

## 2021-02-02 ENCOUNTER — TELEPHONE (OUTPATIENT)
Dept: FAMILY MEDICINE CLINIC | Facility: CLINIC | Age: 81
End: 2021-02-02

## 2021-03-05 ENCOUNTER — TELEPHONE (OUTPATIENT)
Dept: FAMILY MEDICINE CLINIC | Facility: CLINIC | Age: 81
End: 2021-03-05

## 2021-03-13 DIAGNOSIS — Z23 NEED FOR VACCINATION: ICD-10-CM

## 2021-05-01 ENCOUNTER — LAB ENCOUNTER (OUTPATIENT)
Dept: LAB | Age: 81
End: 2021-05-01
Attending: FAMILY MEDICINE
Payer: MEDICARE

## 2021-05-01 ENCOUNTER — OFFICE VISIT (OUTPATIENT)
Dept: FAMILY MEDICINE CLINIC | Facility: CLINIC | Age: 81
End: 2021-05-01
Payer: MEDICARE

## 2021-05-01 VITALS
HEART RATE: 72 BPM | WEIGHT: 193.13 LBS | SYSTOLIC BLOOD PRESSURE: 132 MMHG | BODY MASS INDEX: 38.93 KG/M2 | HEIGHT: 59 IN | DIASTOLIC BLOOD PRESSURE: 78 MMHG

## 2021-05-01 DIAGNOSIS — M85.80 OSTEOPENIA, UNSPECIFIED LOCATION: ICD-10-CM

## 2021-05-01 DIAGNOSIS — E78.1 HYPERTRIGLYCERIDEMIA: ICD-10-CM

## 2021-05-01 DIAGNOSIS — E55.9 VITAMIN D DEFICIENCY: ICD-10-CM

## 2021-05-01 DIAGNOSIS — E03.9 HYPOTHYROIDISM, UNSPECIFIED TYPE: ICD-10-CM

## 2021-05-01 DIAGNOSIS — Z00.00 MEDICARE ANNUAL WELLNESS VISIT, SUBSEQUENT: Primary | ICD-10-CM

## 2021-05-01 DIAGNOSIS — Z00.00 MEDICARE ANNUAL WELLNESS VISIT, SUBSEQUENT: ICD-10-CM

## 2021-05-01 DIAGNOSIS — E66.01 SEVERE OBESITY (BMI 35.0-39.9) WITH COMORBIDITY (HCC): ICD-10-CM

## 2021-05-01 DIAGNOSIS — Z00.00 ENCOUNTER FOR ANNUAL HEALTH EXAMINATION: ICD-10-CM

## 2021-05-01 PROCEDURE — 80061 LIPID PANEL: CPT

## 2021-05-01 PROCEDURE — 86376 MICROSOMAL ANTIBODY EACH: CPT

## 2021-05-01 PROCEDURE — 84439 ASSAY OF FREE THYROXINE: CPT

## 2021-05-01 PROCEDURE — 84481 FREE ASSAY (FT-3): CPT

## 2021-05-01 PROCEDURE — 80053 COMPREHEN METABOLIC PANEL: CPT

## 2021-05-01 PROCEDURE — G0439 PPPS, SUBSEQ VISIT: HCPCS | Performed by: FAMILY MEDICINE

## 2021-05-01 PROCEDURE — 36415 COLL VENOUS BLD VENIPUNCTURE: CPT

## 2021-05-01 PROCEDURE — 82306 VITAMIN D 25 HYDROXY: CPT

## 2021-05-01 NOTE — PROGRESS NOTES
Cognitive Assessment     What day of the week is this?: Correct  What month is it?: Correct  What year is it?: Incorrect  Recall \"Ball\": Correct   Recall \"Flag\":  Incorrect   Recall \"Tree\": Correct

## 2021-05-01 NOTE — PATIENT INSTRUCTIONS
Instrucciones médicas anticipadas    Un formulario de instrucciones médicas anticipadas le permite a usted planear con anticipación el nivel de atención médica que quisiera recibir en pete de no poder expresar lo que desea.  Esta declaración describe el t recuperará. · Entra en efecto solamente cuando usted ya no puede expresar jania deseos. © 1213-3505 [employer] [address]. Todos los derechos reservados.  Esta información no pretende sustituir la atención médica profesional. Sólo price médico puede diagno cama  · Manténgase tan activo mehdi pueda. Un buen equilibrio, flexibilidad, fortaleza y resistencia son producto del ejercicio, y todos juegan un papel importante en la prevención de las caídas.  Pregúntele a price proveedor de [employer] qué tipo de acti Triglycerides (mg/dL)   Date Value   11/08/2019 108        EKG - covered if needed at Welcome to Medicare, and non-screening if indicated for medical reasons Electrocardiogram date11/08/2019 Routine EKG is not a screening covered service except at the 21-65 or Pap+HPV every 5 yrs age 33-67, Covered every 2 yrs up to age 79 or Yearly if High Risk   There are no preventive care reminders to display for this patient.      Chlamydia  Annually if high risk No results found for: CHLAMYDIA No flowsheet data fou necessary form from the [facility]. [email or web address]/books/advin.htm  A link to the [employer].  This site has a lot of good information including definitions of the different types of Ad

## 2021-05-01 NOTE — PROGRESS NOTES
HPI:   Heavenly Amor is a [de-identified]year old female who presents for a Medicare Subsequent Annual Wellness visit (Pt already had Initial Annual Wellness).     No complaints  Annual Physical due on 05/01/2022        Fall/Risk Assessment   She has been screen Team:  Donato Sic, DO as PCP - General (Family Medicine)    Patient Active Problem List:     Screening mammogram, encounter for     Osteopenia     Medicare annual wellness visit, subsequent     Severe obesity (BMI 35.0-39. 9) with comorbidity ([address] exertion  CARDIOVASCULAR: denies chest pain on exertion  GI: denies abdominal pain, denies heartburn  : denies dysuria, vaginal discharge or itching, no complaint of urinary incontinence   MUSCULOSKELETAL: denies back pain  NEURO: denies headaches  PSYCH turgor normal, no rashes or lesions   Lymph nodes: Cervical, supraclavicular, and axillary nodes normal   Neurologic: Normal       Vaccination History     Immunization History   Administered Date(s) Administered   • FLU VAC High Dose 65 YRS & Older PRSV Fr patient  PREVENTATIVE SERVICES  INDICATIONS AND SCHEDULE Internal Lab or Procedure External Lab or Procedure   Diabetes Screening      HbgA1C   Annually No results found for: A1C No flowsheet data found.     Fasting Blood Sugar (FSB)Annually Glucose (mg/dL) after 65 04/10/2013 Please get once after your 65th birthday    Hepatitis B for Moderate/High Risk No vaccine history found Medium/high risk factors:   End-stage renal disease   Hemophiliacs who received Factor VIII or IX concentrates   Clients of institut

## 2021-05-03 ENCOUNTER — TELEPHONE (OUTPATIENT)
Dept: FAMILY MEDICINE CLINIC | Facility: CLINIC | Age: 81
End: 2021-05-03

## 2021-05-03 NOTE — TELEPHONE ENCOUNTER
----- Message from Maranda Schwartz DO sent at 5/3/2021  1:15 PM CDT -----   Vitamin D supplementation ordered.

## 2021-07-31 RX ORDER — ERGOCALCIFEROL 1.25 MG/1
CAPSULE ORAL
Qty: 12 CAPSULE | Refills: 0 | Status: SHIPPED | OUTPATIENT
Start: 2021-07-31 | End: 2021-11-08

## 2021-10-05 RX ORDER — LEVOTHYROXINE SODIUM 0.03 MG/1
25 TABLET ORAL
Qty: 90 TABLET | Refills: 0 | Status: SHIPPED | OUTPATIENT
Start: 2021-10-05

## 2021-10-05 NOTE — TELEPHONE ENCOUNTER
Current Outpatient Medications:     •  LEVOTHYROXINE SODIUM 25 MCG Oral Tab, TAKE 1 TABLET BY MOUTH DAILY BEFORE BREAKFAST, Disp: 90 tablet, Rfl: 1

## 2021-10-05 NOTE — TELEPHONE ENCOUNTER
Please review. Protocol failed or has no protocol.   Requested Prescriptions   Pending Prescriptions Disp Refills    levothyroxine 25 MCG Oral Tab 90 tablet 1     Sig: Take 1 tablet (25 mcg total) by mouth before breakfast.        Thyroid Medication Protoco

## 2021-11-08 RX ORDER — ERGOCALCIFEROL 1.25 MG/1
CAPSULE ORAL
Qty: 13 CAPSULE | Refills: 0 | Status: SHIPPED | OUTPATIENT
Start: 2021-11-08

## 2022-10-22 ENCOUNTER — EKG ENCOUNTER (OUTPATIENT)
Dept: LAB | Age: 82
End: 2022-10-22
Attending: FAMILY MEDICINE
Payer: MEDICARE

## 2022-10-22 ENCOUNTER — LAB ENCOUNTER (OUTPATIENT)
Dept: LAB | Age: 82
End: 2022-10-22
Attending: FAMILY MEDICINE
Payer: MEDICARE

## 2022-10-22 ENCOUNTER — OFFICE VISIT (OUTPATIENT)
Dept: FAMILY MEDICINE CLINIC | Facility: CLINIC | Age: 82
End: 2022-10-22
Payer: MEDICARE

## 2022-10-22 ENCOUNTER — TELEPHONE (OUTPATIENT)
Dept: FAMILY MEDICINE CLINIC | Facility: CLINIC | Age: 82
End: 2022-10-22

## 2022-10-22 VITALS
HEART RATE: 62 BPM | SYSTOLIC BLOOD PRESSURE: 154 MMHG | WEIGHT: 195.31 LBS | DIASTOLIC BLOOD PRESSURE: 73 MMHG | BODY MASS INDEX: 39.37 KG/M2 | HEIGHT: 59 IN

## 2022-10-22 DIAGNOSIS — E55.9 VITAMIN D DEFICIENCY: ICD-10-CM

## 2022-10-22 DIAGNOSIS — E78.1 HYPERTRIGLYCERIDEMIA: ICD-10-CM

## 2022-10-22 DIAGNOSIS — M85.80 OSTEOPENIA, UNSPECIFIED LOCATION: ICD-10-CM

## 2022-10-22 DIAGNOSIS — Z00.00 ENCOUNTER FOR ANNUAL HEALTH EXAMINATION: ICD-10-CM

## 2022-10-22 DIAGNOSIS — Z00.00 MEDICARE ANNUAL WELLNESS VISIT, SUBSEQUENT: ICD-10-CM

## 2022-10-22 DIAGNOSIS — E03.9 HYPOTHYROIDISM, UNSPECIFIED TYPE: ICD-10-CM

## 2022-10-22 DIAGNOSIS — Z00.00 MEDICARE ANNUAL WELLNESS VISIT, SUBSEQUENT: Primary | ICD-10-CM

## 2022-10-22 DIAGNOSIS — L71.9 ROSACEA: ICD-10-CM

## 2022-10-22 DIAGNOSIS — F32.A DEPRESSION, UNSPECIFIED DEPRESSION TYPE: ICD-10-CM

## 2022-10-22 LAB
ALBUMIN SERPL-MCNC: 3.4 G/DL (ref 3.4–5)
ALBUMIN/GLOB SERPL: 0.8 {RATIO} (ref 1–2)
ALP LIVER SERPL-CCNC: 152 U/L
ALT SERPL-CCNC: 65 U/L
ANION GAP SERPL CALC-SCNC: 2 MMOL/L (ref 0–18)
AST SERPL-CCNC: 64 U/L (ref 15–37)
BILIRUB SERPL-MCNC: 1 MG/DL (ref 0.1–2)
BUN BLD-MCNC: 12 MG/DL (ref 7–18)
BUN/CREAT SERPL: 19.4 (ref 10–20)
CALCIUM BLD-MCNC: 9.4 MG/DL (ref 8.5–10.1)
CHLORIDE SERPL-SCNC: 108 MMOL/L (ref 98–112)
CHOLEST SERPL-MCNC: 149 MG/DL (ref ?–200)
CO2 SERPL-SCNC: 30 MMOL/L (ref 21–32)
CREAT BLD-MCNC: 0.62 MG/DL
FASTING PATIENT LIPID ANSWER: YES
FASTING STATUS PATIENT QL REPORTED: YES
GFR SERPLBLD BASED ON 1.73 SQ M-ARVRAT: 89 ML/MIN/1.73M2 (ref 60–?)
GLOBULIN PLAS-MCNC: 4.1 G/DL (ref 2.8–4.4)
GLUCOSE BLD-MCNC: 124 MG/DL (ref 70–99)
HDLC SERPL-MCNC: 45 MG/DL (ref 40–59)
LDLC SERPL CALC-MCNC: 90 MG/DL (ref ?–100)
NONHDLC SERPL-MCNC: 104 MG/DL (ref ?–130)
OSMOLALITY SERPL CALC.SUM OF ELEC: 291 MOSM/KG (ref 275–295)
POTASSIUM SERPL-SCNC: 5 MMOL/L (ref 3.5–5.1)
PROT SERPL-MCNC: 7.5 G/DL (ref 6.4–8.2)
SODIUM SERPL-SCNC: 140 MMOL/L (ref 136–145)
T4 FREE SERPL-MCNC: 1.1 NG/DL (ref 0.8–1.7)
TRIGL SERPL-MCNC: 72 MG/DL (ref 30–149)
TSI SER-ACNC: 4.54 MIU/ML (ref 0.36–3.74)
VIT D+METAB SERPL-MCNC: 26.8 NG/ML (ref 30–100)
VLDLC SERPL CALC-MCNC: 12 MG/DL (ref 0–30)

## 2022-10-22 PROCEDURE — 84443 ASSAY THYROID STIM HORMONE: CPT

## 2022-10-22 PROCEDURE — 93010 ELECTROCARDIOGRAM REPORT: CPT | Performed by: FAMILY MEDICINE

## 2022-10-22 PROCEDURE — 80053 COMPREHEN METABOLIC PANEL: CPT

## 2022-10-22 PROCEDURE — 36415 COLL VENOUS BLD VENIPUNCTURE: CPT

## 2022-10-22 PROCEDURE — 82306 VITAMIN D 25 HYDROXY: CPT

## 2022-10-22 PROCEDURE — 84439 ASSAY OF FREE THYROXINE: CPT

## 2022-10-22 PROCEDURE — 80061 LIPID PANEL: CPT

## 2022-10-22 PROCEDURE — 93005 ELECTROCARDIOGRAM TRACING: CPT

## 2022-10-22 RX ORDER — METRONIDAZOLE 10 MG/G
1 GEL TOPICAL 2 TIMES DAILY
Qty: 1 EACH | Refills: 1 | Status: SHIPPED | OUTPATIENT
Start: 2022-10-22 | End: 2023-02-19

## 2022-10-22 NOTE — PROGRESS NOTES
Cognitive Assessment     What day of the week is this?: Correct  What month is it?: Correct  What year is it?: Incorrect  Recall \"Ball\": Correct   Recall \"Flag\": Correct   Recall \"Tree\": Correct      Hearing Screening    Time taken: 10/22/2022  9:25 AM  Screening Method: Whisper Test  Whisper Test Result: Pass

## 2022-10-24 NOTE — TELEPHONE ENCOUNTER
[phone]  ID# [id number]    [facility]#[id number]    PA completed await outcome

## 2022-10-25 RX ORDER — AZELAIC ACID 0.15 G/G
1 GEL TOPICAL 2 TIMES DAILY
Qty: 1 EACH | Refills: 3 | Status: SHIPPED | OUTPATIENT
Start: 2022-10-25

## 2022-10-27 NOTE — TELEPHONE ENCOUNTER
Called Language lines Denny Jackson ID# [id number]  Left message for patient informing her of medication change

## 2022-11-08 ENCOUNTER — OFFICE VISIT (OUTPATIENT)
Dept: FAMILY MEDICINE CLINIC | Facility: CLINIC | Age: 82
End: 2022-11-08
Payer: MEDICARE

## 2022-11-08 VITALS
HEIGHT: 59 IN | SYSTOLIC BLOOD PRESSURE: 130 MMHG | HEART RATE: 68 BPM | DIASTOLIC BLOOD PRESSURE: 80 MMHG | BODY MASS INDEX: 39.51 KG/M2 | WEIGHT: 196 LBS

## 2022-11-08 DIAGNOSIS — R74.01 ELEVATED TRANSAMINASE LEVEL: ICD-10-CM

## 2022-11-08 DIAGNOSIS — E03.9 HYPOTHYROIDISM, UNSPECIFIED TYPE: Primary | ICD-10-CM

## 2022-11-08 DIAGNOSIS — R73.03 PREDIABETES: ICD-10-CM

## 2022-11-08 PROCEDURE — 99214 OFFICE O/P EST MOD 30 MIN: CPT | Performed by: FAMILY MEDICINE

## 2022-11-08 RX ORDER — LEVOTHYROXINE SODIUM 0.05 MG/1
TABLET ORAL
Qty: 90 TABLET | Refills: 0 | Status: SHIPPED | OUTPATIENT
Start: 2022-11-08

## 2022-11-08 RX ORDER — ERGOCALCIFEROL 1.25 MG/1
50000 CAPSULE ORAL WEEKLY
Qty: 13 CAPSULE | Refills: 0 | Status: SHIPPED | OUTPATIENT
Start: 2022-11-08

## 2022-11-09 NOTE — PROGRESS NOTES
Blood pressure 130/80, pulse 68, height 4' 11\" (1.499 m), weight 196 lb (88.9 kg). Following up for hypothyroidism, vitamin D deficiency and prediabetes. Patient feels well denies complaints.     Objective    Elevated liver enzymes noted    Assessment #1 hypothyroidism #2 vitamin D deficiency #3 prediabetes #4 elevated transaminases    Plan #1 increase levothyroxine #2 start ergocalciferol #3 follow-up hemoglobin A1c #4 follow-up blood testing ordered    Follow-up in 6 weeks

## 2023-02-12 NOTE — TELEPHONE ENCOUNTER
Please review; protocol failed.     Requested Prescriptions   Pending Prescriptions Disp Refills    ERGOCALCIFEROL 1.25 MG (05081 UT) Oral Cap [Pharmacy Med Name: VITAMIN D2 50,000IU (ERGO) CAP RX] 13 capsule 0     Sig: TAKE 1 CAPSULE BY MOUTH 1 TIME A WEEK       There is no refill protocol information for this order              Recent Outpatient Visits              3 months ago Hypothyroidism, unspecified type    [facility], [address], [place], Marlin Paget, DO    Office Visit    [facility]    [facility], [address], [place], [place]    Office Visit    1 year ago Medicare annual wellness visit, subsequent    [facility], [address], [place], [place] Paget, DO    Office Visit    2 years ago Exposure to COVID-19 virus    [address] [facility], Prabhakar Clemente MD    Virtual Phone E/M    3 years ago Age-related nuclear cataract of both eyes    [address] [facility], [facility], [place]    Office Visit

## 2023-02-13 RX ORDER — ERGOCALCIFEROL 1.25 MG/1
CAPSULE ORAL
Qty: 13 CAPSULE | Refills: 0 | Status: SHIPPED | OUTPATIENT
Start: 2023-02-13

## 2023-10-04 ENCOUNTER — OFFICE VISIT (OUTPATIENT)
Dept: FAMILY MEDICINE CLINIC | Facility: CLINIC | Age: 83
End: 2023-10-04

## 2023-10-04 VITALS
WEIGHT: 192 LBS | HEIGHT: 59 IN | HEART RATE: 75 BPM | SYSTOLIC BLOOD PRESSURE: 135 MMHG | DIASTOLIC BLOOD PRESSURE: 80 MMHG | BODY MASS INDEX: 38.71 KG/M2 | RESPIRATION RATE: 17 BRPM

## 2023-10-04 DIAGNOSIS — Z00.00 ENCOUNTER FOR ANNUAL HEALTH EXAMINATION: Primary | ICD-10-CM

## 2023-10-04 DIAGNOSIS — R73.03 PREDIABETES: ICD-10-CM

## 2023-10-04 DIAGNOSIS — Z28.21 IMMUNIZATION NOT CARRIED OUT BECAUSE OF PATIENT REFUSAL: ICD-10-CM

## 2023-10-04 DIAGNOSIS — R20.2 PARESTHESIA OF BOTH LEGS: ICD-10-CM

## 2023-10-04 DIAGNOSIS — H91.93 BILATERAL HEARING LOSS, UNSPECIFIED HEARING LOSS TYPE: ICD-10-CM

## 2023-10-04 DIAGNOSIS — Z13.6 SCREENING FOR CARDIOVASCULAR CONDITION: ICD-10-CM

## 2023-10-04 DIAGNOSIS — E03.9 HYPOTHYROIDISM, UNSPECIFIED TYPE: ICD-10-CM

## 2023-10-04 PROBLEM — R10.84 GENERALIZED ABDOMINAL PAIN: Status: RESOLVED | Noted: 2018-11-21 | Resolved: 2023-10-04

## 2023-10-04 PROCEDURE — G0439 PPPS, SUBSEQ VISIT: HCPCS | Performed by: FAMILY MEDICINE

## 2023-10-04 PROCEDURE — 99213 OFFICE O/P EST LOW 20 MIN: CPT | Performed by: FAMILY MEDICINE

## 2023-10-04 PROCEDURE — G8483 FLU IMM NO ADMIN DOC REA: HCPCS | Performed by: FAMILY MEDICINE

## 2023-10-04 RX ORDER — LEVOTHYROXINE SODIUM 0.05 MG/1
TABLET ORAL
Qty: 90 TABLET | Refills: 0 | Status: SHIPPED | OUTPATIENT
Start: 2023-10-04 | End: 2023-10-05

## 2023-10-05 ENCOUNTER — TELEPHONE (OUTPATIENT)
Dept: FAMILY MEDICINE CLINIC | Facility: CLINIC | Age: 83
End: 2023-10-05

## 2023-10-05 ENCOUNTER — LAB ENCOUNTER (OUTPATIENT)
Dept: LAB | Age: 83
End: 2023-10-05
Attending: FAMILY MEDICINE
Payer: MEDICARE

## 2023-10-05 DIAGNOSIS — E03.9 HYPOTHYROIDISM, UNSPECIFIED TYPE: ICD-10-CM

## 2023-10-05 DIAGNOSIS — R74.8 ELEVATED LIVER ENZYMES: ICD-10-CM

## 2023-10-05 DIAGNOSIS — Z13.6 SCREENING FOR CARDIOVASCULAR CONDITION: ICD-10-CM

## 2023-10-05 DIAGNOSIS — R73.09 ELEVATED GLUCOSE: Primary | ICD-10-CM

## 2023-10-05 DIAGNOSIS — R73.03 PREDIABETES: ICD-10-CM

## 2023-10-05 DIAGNOSIS — R20.2 PARESTHESIA OF BOTH LEGS: ICD-10-CM

## 2023-10-05 LAB
ALBUMIN SERPL-MCNC: 3.5 G/DL (ref 3.4–5)
ALBUMIN/GLOB SERPL: 0.8 {RATIO} (ref 1–2)
ALP LIVER SERPL-CCNC: 155 U/L
ALT SERPL-CCNC: 63 U/L
ANION GAP SERPL CALC-SCNC: 7 MMOL/L (ref 0–18)
AST SERPL-CCNC: 68 U/L (ref 15–37)
BILIRUB SERPL-MCNC: 0.8 MG/DL (ref 0.1–2)
BUN BLD-MCNC: 13 MG/DL (ref 7–18)
BUN/CREAT SERPL: 17.6 (ref 10–20)
CALCIUM BLD-MCNC: 9 MG/DL (ref 8.5–10.1)
CHLORIDE SERPL-SCNC: 108 MMOL/L (ref 98–112)
CHOLEST SERPL-MCNC: 163 MG/DL (ref ?–200)
CO2 SERPL-SCNC: 27 MMOL/L (ref 21–32)
CREAT BLD-MCNC: 0.74 MG/DL
DEPRECATED HBV CORE AB SER IA-ACNC: 43.9 NG/ML
EGFRCR SERPLBLD CKD-EPI 2021: 80 ML/MIN/1.73M2 (ref 60–?)
FASTING PATIENT LIPID ANSWER: YES
FASTING STATUS PATIENT QL REPORTED: YES
FOLATE SERPL-MCNC: 12.1 NG/ML (ref 8.7–?)
GLOBULIN PLAS-MCNC: 4.4 G/DL (ref 2.8–4.4)
GLUCOSE BLD-MCNC: 151 MG/DL (ref 70–99)
HDLC SERPL-MCNC: 48 MG/DL (ref 40–59)
IRON SATN MFR SERPL: 20 %
IRON SERPL-MCNC: 96 UG/DL
LDLC SERPL CALC-MCNC: 100 MG/DL (ref ?–100)
NONHDLC SERPL-MCNC: 115 MG/DL (ref ?–130)
OSMOLALITY SERPL CALC.SUM OF ELEC: 297 MOSM/KG (ref 275–295)
POTASSIUM SERPL-SCNC: 4.2 MMOL/L (ref 3.5–5.1)
PROT SERPL-MCNC: 7.9 G/DL (ref 6.4–8.2)
SODIUM SERPL-SCNC: 142 MMOL/L (ref 136–145)
TIBC SERPL-MCNC: 483 UG/DL (ref 240–450)
TRANSFERRIN SERPL-MCNC: 324 MG/DL (ref 200–360)
TRIGL SERPL-MCNC: 76 MG/DL (ref 30–149)
TSI SER-ACNC: 5.51 MIU/ML (ref 0.36–3.74)
VIT B12 SERPL-MCNC: 390 PG/ML (ref 193–986)
VLDLC SERPL CALC-MCNC: 13 MG/DL (ref 0–30)

## 2023-10-05 PROCEDURE — 84466 ASSAY OF TRANSFERRIN: CPT

## 2023-10-05 PROCEDURE — 36415 COLL VENOUS BLD VENIPUNCTURE: CPT

## 2023-10-05 PROCEDURE — 80053 COMPREHEN METABOLIC PANEL: CPT

## 2023-10-05 PROCEDURE — 82746 ASSAY OF FOLIC ACID SERUM: CPT

## 2023-10-05 PROCEDURE — 84443 ASSAY THYROID STIM HORMONE: CPT

## 2023-10-05 PROCEDURE — 82728 ASSAY OF FERRITIN: CPT

## 2023-10-05 PROCEDURE — 83540 ASSAY OF IRON: CPT

## 2023-10-05 PROCEDURE — 80061 LIPID PANEL: CPT

## 2023-10-05 PROCEDURE — 82607 VITAMIN B-12: CPT

## 2023-10-05 RX ORDER — LEVOTHYROXINE SODIUM 0.07 MG/1
75 TABLET ORAL
Qty: 60 TABLET | Refills: 0 | Status: SHIPPED | OUTPATIENT
Start: 2023-10-05 | End: 2023-12-04

## 2023-10-05 NOTE — TELEPHONE ENCOUNTER
----- Message from Porsha Gann MD sent at 10/5/2023  2:18 PM CDT -----  Results reviewed. Tests show no significant abnormalities. Please inform patient.

## 2023-10-07 RX ORDER — LEVOTHYROXINE SODIUM 0.07 MG/1
75 TABLET ORAL
Qty: 90 TABLET | Refills: 0 | OUTPATIENT
Start: 2023-10-07

## 2023-10-10 ENCOUNTER — OFFICE VISIT (OUTPATIENT)
Dept: AUDIOLOGY | Facility: CLINIC | Age: 83
End: 2023-10-10

## 2023-10-10 ENCOUNTER — OFFICE VISIT (OUTPATIENT)
Dept: OTOLARYNGOLOGY | Facility: CLINIC | Age: 83
End: 2023-10-10

## 2023-10-10 DIAGNOSIS — H91.93 BILATERAL HEARING LOSS, UNSPECIFIED HEARING LOSS TYPE: Primary | ICD-10-CM

## 2023-10-10 DIAGNOSIS — H90.3 SENSORINEURAL HEARING LOSS, BILATERAL: ICD-10-CM

## 2023-10-10 DIAGNOSIS — H90.5 SENSORINEURAL HEARING LOSS (SNHL), UNSPECIFIED LATERALITY: Primary | ICD-10-CM

## 2023-10-10 PROCEDURE — 99203 OFFICE O/P NEW LOW 30 MIN: CPT | Performed by: OTOLARYNGOLOGY

## 2023-10-10 NOTE — PROGRESS NOTES
Jennifer Piper is a 80year old female. Patient presents with:  Ear Problem: Pt reports hearing loss      HISTORY OF PRESENT ILLNESS  She presents today with concerns about hearing loss. She is here with her daughter and they state problems with hearing over the past several years. No other significant otologic signs or symptoms. No other concerns or complaints. Audiogram was performed today based on her hearing issues this demonstrates moderate downsloping to severe sensorineural hearing loss bilaterally with a slight asymmetry on the right. Normal tympanograms and speech discrimination scores. Social History    Socioeconomic History      Marital status:     Tobacco Use      Smoking status: Never      Smokeless tobacco: Never    Vaping Use      Vaping Use: Never used    Substance and Sexual Activity      Alcohol use: No        Alcohol/week: 0.0 standard drinks of alcohol      Drug use: No    Other Topics      Concerns:        Caffeine Concern: Yes          Coffee, 2 cups daily      Family History   Problem Relation Age of Onset    Diabetes Neg     Glaucoma Neg        Past Medical History:   Diagnosis Date    Depression     Gallstones        Past Surgical History:   Procedure Laterality Date    CATARACT  2010    COLONOSCOPY  4/1/2013    EYE SURGERY  2004         REVIEW OF SYSTEMS    System Neg/Pos Details   Constitutional Negative Fatigue, fever and weight loss. ENMT Negative Drooling. Eyes Negative Blurred vision and vision changes. Respiratory Negative Dyspnea and wheezing. Cardio Negative Chest pain, irregular heartbeat/palpitations and syncope. GI Negative Abdominal pain and diarrhea. Endocrine Negative Cold intolerance and heat intolerance. Neuro Negative Tremors. Psych Negative Anxiety and depression. Integumentary Negative Frequent skin infections, pigment change and rash. Hema/Lymph Negative Easy bleeding and easy bruising.            PHYSICAL EXAM    There were no vitals taken for this visit. Constitutional Normal Overall appearance - Normal.   Psychiatric Normal Orientation - Oriented to time, place, person & situation. Appropriate mood and affect. Neck Exam Normal Inspection - Normal. Palpation - Normal. Parotid gland - Normal. Thyroid gland - Normal.   Eyes Normal Conjunctiva - Right: Normal, Left: Normal. Pupil - Right: Normal, Left: Normal. Fundus - Right: Normal, Left: Normal.   Neurological Normal Memory - Normal. Cranial nerves - Cranial nerves II through XII grossly intact. Head/Face Normal Facial features - Normal. Eyebrows - Normal. Skull - Normal.        Nasopharynx Normal External nose - Normal. Lips/teeth/gums - Normal. Tonsils - Normal. Oropharynx - Normal.   Ears Normal Inspection - Right: Normal, Left: Normal. Canal - Right: Normal, Left: Normal. TM - Right: Normal, Left: Normal.   Skin Normal Inspection - Normal.        Lymph Detail Normal Submental. Submandibular. Anterior cervical. Posterior cervical. Supraclavicular. Nose/Mouth/Throat Normal External nose - Normal. Lips/teeth/gums - Normal. Tonsils - Normal. Oropharynx - Normal.   Nose/Mouth/Throat Normal Nares - Right: Normal Left: Normal. Septum -Normal  Turbinates - Right: Normal, Left: Normal.       Current Outpatient Medications:     levothyroxine 75 MCG Oral Tab, Take 1 tablet (75 mcg total) by mouth before breakfast. TAKE 1 TABLET BY MOUTH DAILY, Disp: 60 tablet, Rfl: 0    ERGOCALCIFEROL 1.25 MG (45525 UT) Oral Cap, TAKE 1 CAPSULE BY MOUTH 1 TIME A WEEK (Patient not taking: Reported on 10/4/2023), Disp: 13 capsule, Rfl: 0    Azelaic Acid (FINACEA) 15 % External Gel, Apply 1 Application topically 2 (two) times daily. (Patient not taking: Reported on 10/4/2023), Disp: 1 each, Rfl: 3    Pseudoephedrine-Ibuprofen (ADVIL COLD/SINUS OR), Take by mouth.  (Patient not taking: Reported on 10/4/2023), Disp: , Rfl:     Calcium Carbonate (CALCIUM 600) 600 MG Oral Tab, Take by oral route 2 tablets or 1200 mg daily (Patient not taking: Reported on 10/4/2023), Disp: 120 tablet, Rfl: 11    ondansetron 4 MG Oral Tablet Dispersible, , Disp: , Rfl:     LUBRICANT EYE DROPS 0.4-0.3 % Ophthalmic Solution, , Disp: , Rfl:     omeprazole 20 MG Oral Capsule Delayed Release, Take 1 capsule (20 mg total) by mouth as needed. , Disp: , Rfl:     TUMS OR, prn (Patient not taking: Reported on 10/4/2023), Disp: , Rfl:   ASSESSMENT AND PLAN    1. Bilateral hearing loss, unspecified hearing loss type  Normal physical examination abnormal hearing loss on audiogram which was reviewed with the patient. Moderate downsloping to severe sensorineural hearing loss. I did recommend that she consider amplification. She will return to our office for repeat audiogram in about a  - OP REFERRAL TO AUDIOLOGY        This note was prepared using [facility] voice recognition dictation software. As a result errors may occur. When identified these errors have been corrected. While every attempt is made to correct errors during dictation discrepancies may still exist    Jg Boateng MD    10/10/2023    6:07 PM

## 2023-11-27 ENCOUNTER — DOCUMENTATION ONLY (OUTPATIENT)
Dept: FAMILY MEDICINE CLINIC | Facility: CLINIC | Age: 83
End: 2023-11-27

## 2024-01-03 NOTE — PROGRESS NOTES
Subjective:   Juhi Marcos is a 83 year old female who presents for Back Pain (Patient C/O back and abdominal pain for one weeks. )   Patient is a pleasant 83-year-old Niuean-speaking female with past medical history significant for depression, cholelithiasis, prediabetes, hypothyroidism, hyperlipidemia, vitamin D deficiency, and obesity.  Patient presents to the office today .  Patient states via Brianna [phone] she has pain in the back that goes to the front for the past 8 days. Pt reports a previous cold and cough and had a strong cough. Does not report any fevers at home. Has not taken anything for the pain.  Pt is having a hard time walking and has swelling in her feet for two weeks.  Recently back from [place] with long bus ride as well.     +2 edema right , +1 left    Follow up 1 week for BP.   Left lower back pain with CVA tenderness UA        Past Medical History:   Diagnosis Date    Depression     Gallstones       Past Surgical History:   Procedure Laterality Date    Cataract  2010    Colonoscopy  4/1/2013    Eye surgery  2004        History/Other:    Chief Complaint Reviewed and Verified  Nursing Notes Reviewed and   Verified  Tobacco Reviewed  Allergies Reviewed  Medications Reviewed    Problem List Reviewed  Medical History Reviewed  Surgical History   Reviewed  Family History Reviewed  Social History Reviewed         Tobacco:  She has never smoked tobacco.    Current Outpatient Medications   Medication Sig Dispense Refill    cyclobenzaprine 5 MG Oral Tab Take 1 tablet (5 mg total) by mouth 3 (three) times daily as needed for Muscle spasms. 30 tablet 0    ERGOCALCIFEROL 1.25 MG (94136 UT) Oral Cap TAKE 1 CAPSULE BY MOUTH 1 TIME A WEEK (Patient not taking: Reported on 10/4/2023) 13 capsule 0    Azelaic Acid (FINACEA) 15 % External Gel Apply 1 Application topically 2 (two) times daily. (Patient not taking: Reported on 10/4/2023) 1 each 3    Pseudoephedrine-Ibuprofen (ADVIL COLD/SINUS OR)  Take by mouth. (Patient not taking: Reported on 10/4/2023)      Calcium Carbonate (CALCIUM 600) 600 MG Oral Tab Take by oral route 2 tablets or 1200 mg daily (Patient not taking: Reported on 10/4/2023) 120 tablet 11    ondansetron 4 MG Oral Tablet Dispersible  (Patient not taking: Reported on 10/4/2023)      LUBRICANT EYE DROPS 0.4-0.3 % Ophthalmic Solution  (Patient not taking: Reported on 10/4/2023)      omeprazole 20 MG Oral Capsule Delayed Release Take 1 capsule (20 mg total) by mouth as needed. (Patient not taking: Reported on 1/4/2024)      TUMS OR prn (Patient not taking: Reported on 10/4/2023)           Review of Systems:  Review of Systems   Constitutional:  Positive for activity change. Negative for appetite change and fever.   HENT: Negative.  Negative for congestion, postnasal drip, rhinorrhea, sore throat, tinnitus and voice change.    Eyes: Negative.    Respiratory:  Positive for cough. Negative for apnea, chest tightness and shortness of breath.    Cardiovascular:  Negative for chest pain and leg swelling.   Gastrointestinal: Negative.  Negative for abdominal pain, anal bleeding and blood in stool.   Genitourinary: Negative.  Negative for difficulty urinating, flank pain and menstrual problem.   Musculoskeletal:  Positive for arthralgias, back pain and gait problem. Negative for joint swelling.   Skin: Negative.    Neurological:  Negative for dizziness, weakness, numbness and headaches.   Psychiatric/Behavioral: Negative.  Negative for agitation.          Objective:   /82 (BP Location: Right arm)   Pulse 69   Ht 4' 8\" (1.422 m)   Wt 120 lb (54.4 kg)   BMI 26.90 kg/m²  Estimated body mass index is 26.9 kg/m² as calculated from the following:    Height as of this encounter: 4' 8\" (1.422 m).    Weight as of this encounter: 120 lb (54.4 kg).  Physical Exam  Vitals and nursing note reviewed.   Constitutional:       General: She is not in acute distress.     Appearance: Normal appearance. She is  well-developed and normal weight.   HENT:      Head: Normocephalic and atraumatic.      Right Ear: Tympanic membrane, ear canal and external ear normal.      Left Ear: Tympanic membrane, ear canal and external ear normal.      Nose: Nose normal.      Mouth/Throat:      Mouth: Mucous membranes are moist.      Pharynx: Oropharynx is clear.   Eyes:      Conjunctiva/sclera: Conjunctivae normal.      Pupils: Pupils are equal, round, and reactive to light.   Neck:      Thyroid: No thyromegaly.   Cardiovascular:      Rate and Rhythm: Normal rate and regular rhythm.      Pulses: Normal pulses.           Dorsalis pedis pulses are 2+ on the right side and 2+ on the left side.      Heart sounds: Normal heart sounds. No murmur heard.  Pulmonary:      Effort: Pulmonary effort is normal. No respiratory distress.      Breath sounds: Normal breath sounds. No wheezing or rales.   Chest:      Chest wall: No tenderness.   Abdominal:      General: Bowel sounds are normal. There is no distension.      Palpations: Abdomen is soft.      Tenderness: There is no abdominal tenderness. There is left CVA tenderness.   Musculoskeletal:         General: No tenderness. Normal range of motion.      Cervical back: Normal range of motion and neck supple.      Right lower le+ Edema present.      Left lower le+ Edema present.   Lymphadenopathy:      Cervical: No cervical adenopathy.   Skin:     General: Skin is warm and dry.      Capillary Refill: Capillary refill takes less than 2 seconds.      Findings: No rash.   Neurological:      Mental Status: She is alert and oriented to person, place, and time.      Coordination: Coordination normal.   Psychiatric:         Behavior: Behavior normal.         Thought Content: Thought content normal.         Judgment: Judgment normal.           Assessment & Plan:   1. Cough, unspecified type (Primary)  Assessment & Plan:  Patient reports having a respiratory infection with cough.  Cough is still so bad at  times her back started to hurt.  Patient denies cough at this time.  Offered cough syrup, patient using over-the-counter.  Follow-up as needed.  2. Acute low back pain without sciatica, unspecified back pain laterality  Assessment & Plan:  Patient reports lower back pain that radiates to the front of the left side wraps around.  Patient positive for left CVA tenderness.  UA in office negative for hematuria.  Likely no stone..  Orders:  -     URINALYSIS, AUTO, W/O SCOPE  3. Bilateral lower extremity edema  Assessment & Plan:  Patient with new onset bilateral lower extremity edema.  Reports has been there for about 2 weeks.  Patient recently in [place] where she was on a long bus ride with feet dependent position.  Educated on measures to facilitate drainage.  Right lower extremities +2 edema left lower extremity +1 rule out DVT on right lower extremity with duplex.  BNP ordered, if elevated consider cardio consult and echocardiogram.  Orders:  -     US ARTERIAL DUPLEX LOWER EXTREMITY RIGHT (CPT=93926); Future; Expected date: 01/04/2024  -     TSH W Reflex To Free T4; Future; Expected date: 01/04/2024  -     Comp Metabolic Panel (14); Future; Expected date: 01/04/2024  -     BNP (Brain Natriuretic Peptide); Future; Expected date: 01/04/2024  -     CBC With Differential With Platelet; Future; Expected date: 01/04/2024  4. Elevated blood pressure reading  Assessment & Plan:  Patient blood pressure reading elevated in office today.  Patient in pain.  Patient will follow-up in 1 week to reeval since she does not have a home blood pressure cuff if remains elevated consider starting low-dose diuretic for swelling in extremities if not secondary to clot.  5. Back strain, initial encounter  Assessment & Plan:  With negative UA and history of coughing spells with recent trip to [place] likely back strain and/or inflammatory process in lower back due to arthritis.  Will treat with extra strength Tylenol 1000 every 6 hours as  needed for pain and short course of Flexeril 3 times daily as needed.  Follow-up 1 week.    Patient aware of plan of care. All questions answered to satisfaction of the patient. Patient instructed to call office or reach out via [employer] if any issues arise. For urgent issues and/or reviewed red flags please proceed to the urgent care or ER.  Also, inform the nurse practitioner with any new symptoms or medication side effects.      Orders:  -     Cyclobenzaprine HCl; Take 1 tablet (5 mg total) by mouth 3 (three) times daily as needed for Muscle spasms.  Dispense: 30 tablet; Refill: 0        Return in about 1 week (around 1/11/2024).    SUZANNA Barba, 1/3/2024, 12:56 PM

## 2024-01-04 ENCOUNTER — OFFICE VISIT (OUTPATIENT)
Dept: FAMILY MEDICINE CLINIC | Facility: CLINIC | Age: 84
End: 2024-01-04

## 2024-01-04 ENCOUNTER — LAB ENCOUNTER (OUTPATIENT)
Dept: LAB | Age: 84
End: 2024-01-04
Payer: MEDICARE

## 2024-01-04 VITALS
WEIGHT: 120 LBS | BODY MASS INDEX: 26.99 KG/M2 | HEART RATE: 69 BPM | SYSTOLIC BLOOD PRESSURE: 154 MMHG | DIASTOLIC BLOOD PRESSURE: 82 MMHG | HEIGHT: 56 IN

## 2024-01-04 DIAGNOSIS — R60.0 BILATERAL LOWER EXTREMITY EDEMA: ICD-10-CM

## 2024-01-04 DIAGNOSIS — R05.9 COUGH, UNSPECIFIED TYPE: Primary | ICD-10-CM

## 2024-01-04 DIAGNOSIS — R03.0 ELEVATED BLOOD PRESSURE READING: ICD-10-CM

## 2024-01-04 DIAGNOSIS — S39.012A BACK STRAIN, INITIAL ENCOUNTER: ICD-10-CM

## 2024-01-04 DIAGNOSIS — M54.50 ACUTE LOW BACK PAIN WITHOUT SCIATICA, UNSPECIFIED BACK PAIN LATERALITY: ICD-10-CM

## 2024-01-04 LAB
ALBUMIN SERPL-MCNC: 4 G/DL (ref 3.2–4.8)
ALBUMIN/GLOB SERPL: 1.2 {RATIO} (ref 1–2)
ALP LIVER SERPL-CCNC: 142 U/L
ALT SERPL-CCNC: 27 U/L
ANION GAP SERPL CALC-SCNC: 4 MMOL/L (ref 0–18)
APPEARANCE: CLEAR
AST SERPL-CCNC: 41 U/L (ref ?–34)
BASOPHILS # BLD AUTO: 0.04 X10(3) UL (ref 0–0.2)
BASOPHILS NFR BLD AUTO: 0.7 %
BILIRUB SERPL-MCNC: 0.8 MG/DL (ref 0.2–1.1)
BILIRUBIN: NEGATIVE
BNP SERPL-MCNC: 112 PG/ML
BUN BLD-MCNC: 9 MG/DL (ref 9–23)
BUN/CREAT SERPL: 12.9 (ref 10–20)
CALCIUM BLD-MCNC: 9.2 MG/DL (ref 8.7–10.4)
CHLORIDE SERPL-SCNC: 104 MMOL/L (ref 98–112)
CO2 SERPL-SCNC: 31 MMOL/L (ref 21–32)
CREAT BLD-MCNC: 0.7 MG/DL
DEPRECATED RDW RBC AUTO: 43.7 FL (ref 35.1–46.3)
EGFRCR SERPLBLD CKD-EPI 2021: 86 ML/MIN/1.73M2 (ref 60–?)
EOSINOPHIL # BLD AUTO: 0.33 X10(3) UL (ref 0–0.7)
EOSINOPHIL NFR BLD AUTO: 5.5 %
ERYTHROCYTE [DISTWIDTH] IN BLOOD BY AUTOMATED COUNT: 13.2 % (ref 11–15)
FASTING STATUS PATIENT QL REPORTED: YES
GLOBULIN PLAS-MCNC: 3.4 G/DL (ref 2.8–4.4)
GLUCOSE (URINE DIPSTICK): NEGATIVE MG/DL
GLUCOSE BLD-MCNC: 104 MG/DL (ref 70–99)
HCT VFR BLD AUTO: 41.9 %
HGB BLD-MCNC: 13.3 G/DL
IMM GRANULOCYTES # BLD AUTO: 0.01 X10(3) UL (ref 0–1)
IMM GRANULOCYTES NFR BLD: 0.2 %
KETONES (URINE DIPSTICK): NEGATIVE MG/DL
LEUKOCYTES: NEGATIVE
LYMPHOCYTES # BLD AUTO: 1.55 X10(3) UL (ref 1–4)
LYMPHOCYTES NFR BLD AUTO: 25.7 %
MCH RBC QN AUTO: 28.6 PG (ref 26–34)
MCHC RBC AUTO-ENTMCNC: 31.7 G/DL (ref 31–37)
MCV RBC AUTO: 90.1 FL
MONOCYTES # BLD AUTO: 0.43 X10(3) UL (ref 0.1–1)
MONOCYTES NFR BLD AUTO: 7.1 %
MULTISTIX LOT#: NORMAL NUMERIC
NEUTROPHILS # BLD AUTO: 3.66 X10 (3) UL (ref 1.5–7.7)
NEUTROPHILS # BLD AUTO: 3.66 X10(3) UL (ref 1.5–7.7)
NEUTROPHILS NFR BLD AUTO: 60.8 %
NITRITE, URINE: NEGATIVE
OCCULT BLOOD: NEGATIVE
OSMOLALITY SERPL CALC.SUM OF ELEC: 287 MOSM/KG (ref 275–295)
PH, URINE: 8 (ref 4.5–8)
PLATELET # BLD AUTO: 271 10(3)UL (ref 150–450)
POTASSIUM SERPL-SCNC: 4.6 MMOL/L (ref 3.5–5.1)
PROT SERPL-MCNC: 7.4 G/DL (ref 5.7–8.2)
PROTEIN (URINE DIPSTICK): NEGATIVE MG/DL
RBC # BLD AUTO: 4.65 X10(6)UL
SODIUM SERPL-SCNC: 139 MMOL/L (ref 136–145)
SPECIFIC GRAVITY: 1.02 (ref 1–1.03)
T4 FREE SERPL-MCNC: 1.2 NG/DL (ref 0.8–1.7)
TSI SER-ACNC: 4.95 MIU/ML (ref 0.55–4.78)
URINE-COLOR: YELLOW
UROBILINOGEN,SEMI-QN: 1 MG/DL (ref 0–1.9)
WBC # BLD AUTO: 6 X10(3) UL (ref 4–11)

## 2024-01-04 PROCEDURE — 80053 COMPREHEN METABOLIC PANEL: CPT

## 2024-01-04 PROCEDURE — 81003 URINALYSIS AUTO W/O SCOPE: CPT

## 2024-01-04 PROCEDURE — 83880 ASSAY OF NATRIURETIC PEPTIDE: CPT

## 2024-01-04 PROCEDURE — 36415 COLL VENOUS BLD VENIPUNCTURE: CPT

## 2024-01-04 PROCEDURE — 85025 COMPLETE CBC W/AUTO DIFF WBC: CPT

## 2024-01-04 PROCEDURE — 99215 OFFICE O/P EST HI 40 MIN: CPT

## 2024-01-04 PROCEDURE — 84443 ASSAY THYROID STIM HORMONE: CPT

## 2024-01-04 PROCEDURE — 84439 ASSAY OF FREE THYROXINE: CPT

## 2024-01-04 RX ORDER — CYCLOBENZAPRINE HCL 5 MG
5 TABLET ORAL 3 TIMES DAILY PRN
Qty: 30 TABLET | Refills: 0 | Status: SHIPPED | OUTPATIENT
Start: 2024-01-04

## 2024-01-04 NOTE — ASSESSMENT & PLAN NOTE
Patient blood pressure reading elevated in office today.  Patient in pain.  Patient will follow-up in 1 week to reeval since she does not have a home blood pressure cuff if remains elevated consider starting low-dose diuretic for swelling in extremities if not secondary to clot.

## 2024-01-04 NOTE — ASSESSMENT & PLAN NOTE
Patient with new onset bilateral lower extremity edema.  Reports has been there for about 2 weeks.  Patient recently in [place] where she was on a long bus ride with feet dependent position.  Educated on measures to facilitate drainage.  Right lower extremities +2 edema left lower extremity +1 rule out DVT on right lower extremity with duplex.  BNP ordered, if elevated consider cardio consult and echocardiogram.

## 2024-01-04 NOTE — ASSESSMENT & PLAN NOTE
Patient reports lower back pain that radiates to the front of the left side wraps around.  Patient positive for left CVA tenderness.  UA in office negative for hematuria.  Likely no stone..

## 2024-01-04 NOTE — ASSESSMENT & PLAN NOTE
With negative UA and history of coughing spells with recent trip to [place] likely back strain and/or inflammatory process in lower back due to arthritis.  Will treat with extra strength Tylenol 1000 every 6 hours as needed for pain and short course of Flexeril 3 times daily as needed.  Follow-up 1 week.    Patient aware of plan of care. All questions answered to satisfaction of the patient. Patient instructed to call office or reach out via [employer] if any issues arise. For urgent issues and/or reviewed red flags please proceed to the urgent care or ER.  Also, inform the nurse practitioner with any new symptoms or medication side effects.

## 2024-01-04 NOTE — PATIENT INSTRUCTIONS
[place] Tylenol extra mi para el dolor, 1000 miligramos cada 6 horas, según sea necesario.    Háganse análisis de srikanth abajo hoy.    Revisaré los resultados y tomaré más decisiones a partir de ahí.    Seguimiento de gurpreet semana para la presión arterial.    Por favor agendar [employer] [place].

## 2024-01-04 NOTE — ASSESSMENT & PLAN NOTE
Patient reports having a respiratory infection with cough.  Cough is still so bad at times her back started to hurt.  Patient denies cough at this time.  Offered cough syrup, patient using over-the-counter.  Follow-up as needed.

## 2024-01-19 DIAGNOSIS — E03.9 HYPOTHYROIDISM, UNSPECIFIED TYPE: Primary | ICD-10-CM

## 2024-01-19 RX ORDER — LEVOTHYROXINE SODIUM 0.1 MG/1
100 TABLET ORAL
Qty: 30 TABLET | Refills: 3 | Status: SHIPPED | OUTPATIENT
Start: 2024-01-19

## 2024-01-19 NOTE — PROGRESS NOTES
1. Hypothyroidism, unspecified type  Patient's TSH 4.947 on recent labs remains elevated previously 5.5100 three months ago.  RN spoke with patient's family and states patient was taking medication with breakfast and not taking prior to breakfast.  Educated on importance of prior to meal.  Due to patient's swelling in lower extremities and symptoms will increase dose to 100 mcg every morning prior to breakfast.  Will recheck TSH with reflex in 6 weeks.    SUZANNA Barba

## 2024-02-08 ENCOUNTER — TELEPHONE (OUTPATIENT)
Dept: FAMILY MEDICINE CLINIC | Facility: CLINIC | Age: 84
End: 2024-02-08

## 2024-07-16 DIAGNOSIS — E03.9 HYPOTHYROIDISM, UNSPECIFIED TYPE: ICD-10-CM

## 2024-07-19 RX ORDER — LEVOTHYROXINE SODIUM 0.1 MG/1
100 TABLET ORAL
Qty: 90 TABLET | Refills: 0 | Status: SHIPPED | OUTPATIENT
Start: 2024-07-19

## 2024-07-19 NOTE — TELEPHONE ENCOUNTER
Please review. Protocol Failed; No Protocol    Requested Prescriptions   Pending Prescriptions Disp Refills    LEVOTHYROXINE 100 MCG Oral Tab [Pharmacy Med Name: LEVOTHYROXINE 0.100MG (100MCG) TAB] 90 tablet 0     Sig: TAKE 1 TABLET BY MOUTH BEFORE BREAKFAST       Thyroid Medication Protocol Failed - 7/16/2024  5:08 PM        Failed - Last TSH value is normal     Lab Results   Component Value Date    TSH 4.947 (H) 01/04/2024                 Passed - TSH in past 12 months        Passed - In person appointment or virtual visit in the past 12 mos or appointment in next 3 mos     Recent Outpatient Visits              6 months ago Cough, unspecified type    [place] Wilfrido Zimmer APRN    Office Visit    9 months ago Sensorineural hearing loss (SNHL), unspecified laterality    [facility]    [facility] Visit    9 months ago Bilateral hearing loss, unspecified hearing loss type    [facility]    [facility]    9 months ago Encounter for annual health examination    [facility], [address], [place] Juhi Green MD    Office Visit    1 year ago Hypothyroidism, unspecified type    [facility], [address], [place] Sergio Grullon DO    Office Visit                               Recent Outpatient Visits              6 months ago Cough, unspecified type    [place] Wilfrido Zimmer APRN    Office Visit    9 months ago Sensorineural hearing loss (SNHL), unspecified laterality    [facility]    Office Visit    9 months ago Bilateral hearing loss, unspecified hearing loss type    [facility]    [facility]    9 months ago Encounter for annual health  examination    [facility], [address], [place] Juhi Green MD    Office Visit    1 year ago Hypothyroidism, unspecified type    [facility], [address], [place] Sergio Grullon DO    Office Visit

## 2024-10-09 DIAGNOSIS — E03.9 HYPOTHYROIDISM, UNSPECIFIED TYPE: ICD-10-CM

## 2024-10-09 RX ORDER — LEVOTHYROXINE SODIUM 100 UG/1
100 TABLET ORAL
Qty: 90 TABLET | Refills: 0 | Status: SHIPPED | OUTPATIENT
Start: 2024-10-09

## 2024-10-09 NOTE — TELEPHONE ENCOUNTER
Patient's daughter , Julita is requesting a medication refill and mentions the patient does not have any remaining    Thyroid medication    [facility] in [facility] confirmed preferred

## 2024-10-09 NOTE — TELEPHONE ENCOUNTER
Please review; protocol failed/ has no protocol      Please see message below for upcoming appointment.    Future Appointments   Date Time Provider Department Center   12/9/2024  4:00 PM Opal Presley MD [facility]     Patient's daughter Julita is requesting a medication refill and mentions the patient does not have any remaining     Thyroid medication     [facility] in [facility] confirmed preferred          Requested Prescriptions   Pending Prescriptions Disp Refills    levothyroxine 100 MCG Oral Tab 90 tablet 0     Sig: Take 1 tablet (100 mcg total) by mouth before breakfast. PATIENT NEEDS FASTING BLOOD TEST FOLLOWED BY APPOINTMENT.       Thyroid Medication Protocol Failed - 10/9/2024  2:28 PM        Failed - Last TSH value is normal     Lab Results   Component Value Date    TSH 4.947 (H) 01/04/2024                 Passed - TSH in past 12 months        Passed - In person appointment or virtual visit in the past 12 mos or appointment in next 3 mos     Recent Outpatient Visits              9 months ago Cough, unspecified type    [facility] Wilfrido Callahan APRN    Office Visit    1 year ago Sensorineural hearing loss (SNHL), unspecified laterality    [facility] Lupis Vu MS, CCC-A    Office Visit    1 year ago Bilateral hearing loss, unspecified hearing loss type    [facility] Jg James MD    Office Visit    1 year ago Encounter for annual health examination    [facility] [address], [place] Juhi Green MD    Office Visit    1 year ago Hypothyroidism, unspecified type    [facility] [address], [place] Sergio Grullon DO    Office Visit          Future Appointments         Provider Department Appt Notes    In 2 months Opal Presley MD [facility] px  Policy  advised  Daughter shanon appt                       Recent Outpatient Visits              9 months ago Cough, unspecified type    [facility] Wilfrido Callahan APRN    Office Visit    1 year ago Sensorineural hearing loss (SNHL), unspecified laterality    [facility], Lupis [place], MS, CCC-A    Office Visit    1 year ago Bilateral hearing loss, unspecified hearing loss type    [facility] [place]    Office Visit    1 year ago Encounter for annual health examination    [facility] [address], [place] Juhi Green MD    Office Visit    1 year ago Hypothyroidism, unspecified type    [facility] [address], [place] Sergio Grullon DO    Office Visit          Future Appointments         Provider Department Appt Notes    In 2 months Opal Presley MD [facility] px  Policy advised  Daughter shanon appt

## 2024-12-09 ENCOUNTER — TELEPHONE (OUTPATIENT)
Dept: FAMILY MEDICINE CLINIC | Facility: CLINIC | Age: 84
End: 2024-12-09

## 2024-12-09 NOTE — TELEPHONE ENCOUNTER
Patient daughter andreas called and states that patient was told by Dr. Presley that she will be there Thursday and to make an appointment for Thursday.

## 2024-12-13 NOTE — TELEPHONE ENCOUNTER
Spoke to daughter, patient requesting to do physical as it is difficult for her to travel outside of her home, appointment scheduled.

## 2025-01-24 DIAGNOSIS — E03.9 HYPOTHYROIDISM, UNSPECIFIED TYPE: ICD-10-CM

## 2025-01-24 NOTE — TELEPHONE ENCOUNTER
Pt is requesting refill for the following medication          levothyroxine 100 MCG Oral Tab, Take 1 tablet (100 mcg total) by mouth before breakfast. PATIENT NEEDS FASTING BLOOD TEST FOLLOWED BY APPOINTMENT., Disp: 90 tablet, Rfl: 0

## 2025-01-29 RX ORDER — LEVOTHYROXINE SODIUM 100 UG/1
100 TABLET ORAL
Qty: 90 TABLET | Refills: 0 | OUTPATIENT
Start: 2025-01-29

## 2025-01-29 NOTE — TELEPHONE ENCOUNTER
To reception staff, pls call pt for appt and labs.  Lab order pended.       No future appointments.          Protocol Failed/ No Protocol    Requested Prescriptions   Pending Prescriptions Disp Refills    levothyroxine 100 MCG Oral Tab 90 tablet 0     Sig: Take 1 tablet (100 mcg total) by mouth before breakfast. PATIENT NEEDS FASTING BLOOD TEST FOLLOWED BY APPOINTMENT.       Thyroid Medication Protocol Failed - 1/29/2025  5:45 PM        Failed - TSH in past 12 months        Failed - Last TSH value is normal     Lab Results   Component Value Date    TSH 4.947 (H) 01/04/2024                 Failed - In person appointment or virtual visit in the past 12 mos or appointment in next 3 mos     Recent Outpatient Visits              1 year ago Cough, unspecified type    [facility], Wilfrido Zimmer APRN    Office Visit    1 year ago Sensorineural hearing loss (SNHL), unspecified laterality    [facility], Lupis [place], MS, CCC-A    Office Visit    1 year ago Bilateral hearing loss, unspecified hearing loss type    [facility] Jg Rai MD    Office Visit    1 year ago Encounter for annual health examination    [facility] [address], [place] Juhi Green MD    Office Visit    2 years ago Hypothyroidism, unspecified type    [facility] [address], [place] Sergio Grullon DO    Office Visit                      Passed - Medication is active on med list               Recent Outpatient Visits              1 year ago Cough, unspecified type    [facility], Wilfrido Zimmer APRN    Office Visit    1 year ago Sensorineural hearing loss (SNHL), unspecified laterality    [facility]    Office Visit    1 year ago Bilateral hearing loss, unspecified hearing  loss [facility]    [facility]    1 year ago Encounter for annual health examination    [facility] [address], [place] Juhi Green MD    Office Visit    2 years ago Hypothyroidism, unspecified type    [facility] [address], [place] Sergio Grullon DO    Office Visit

## 2025-01-30 NOTE — TELEPHONE ENCOUNTER
Daughter requesting to know if a prescription can be sent to the pharmacy for Omeprazole 20 MG.     Daughter states she has been purchasing this OTC paying out of pocket and would like to know if a prescription can be sent so insurance can pay for medication.    Please advise

## 2025-01-31 NOTE — TELEPHONE ENCOUNTER
[facility] called to follow up on this refill. Explained to the pharmacy tech this refill is supposed to go to the [facility] in the patient's profile.  Pharmacy tech at [facility] stated she will call the patient to get clarification.

## 2025-02-03 NOTE — TELEPHONE ENCOUNTER
Please review; protocol failed/No Protocol    Former Dr. Amaya patient    Patient has been paying out of pocket and wants a script to have insurance pay for it    Future Appointments   Date Time Provider Department Center   4/12/2025  8:15 AM Opal Presley MD [facility]       Requested Prescriptions   Pending Prescriptions Disp Refills    omeprazole 20 MG Oral Capsule Delayed Release 90 capsule 1     Sig: Take 1 capsule (20 mg total) by mouth every morning before breakfast.       Gastrointestional Medication Protocol Failed - 2/3/2025 12:48 PM        Failed - Medication is active on med list        Passed - In person appointment or virtual visit in the past 12 mos or appointment in next 3 mos     Recent Outpatient Visits              1 year ago Cough, unspecified type    [facility] Wilfrido Callahan APRN    Office Visit    1 year ago Sensorineural hearing loss (SNHL), unspecified laterality    [facility]    Office Visit    1 year ago Bilateral hearing loss, unspecified hearing loss type    [facility]    [facility] Visit    1 year ago Encounter for annual health examination    [facility] [address], [place] Juhi Green MD    Office Visit    2 years ago Hypothyroidism, unspecified type    [facility]    [facility]          Future Appointments         Provider Department Appt Notes    In 2 months Opal Presley MD [facility] px                       Future Appointments         Provider Department Appt Notes    In 2 months Opal Presley MD [facility] px          Recent Outpatient Visits              1 year ago Cough, unspecified type    [facility], Wilfrido Zimmer APRN    Office Visit    1 year ago Sensorineural hearing loss (SNHL), unspecified laterality    [facility] [place]    Office Visit    1 year ago Bilateral hearing loss, unspecified hearing loss type    [facility], Jg Rai MD    Office Visit    1 year ago Encounter for annual health examination    [facility] [address], [place] Juhi Green MD    Office Visit    2 years ago Hypothyroidism, unspecified type    [facility] [address], [place] Sergio Grullon DO    Office Visit

## (undated) NOTE — LETTER
02/03/22        [place] [address]      Dear Martin Vazquez records indicate that you have outstanding lab work and or testing that was ordered for you and has not yet been completed:  Orders Placed This Encounter

## (undated) NOTE — MR AVS SNAPSHOT
[facility]  [address]  [phone]               Thank you for choosing us for your health care visit with Neda Padgett MD.  We are glad to serve you and happy to provide you with this summary LUBRICANT EYE DROPS 0.4-0.3 % Soln   Generic drug:  Polyethyl Glycol-Propyl Glycol           MEDICATED CHEST RUB 4.73-1.2-2.6 % Oint           NASONEX 50 MCG/ACT Susp   Generic drug:  Mometasone Furoate   1 spray bilat  nares bid           * omeprazole 2m

## (undated) NOTE — MR AVS SNAPSHOT
[address]  [address]  [phone]               Thank you for choosing us for your health care visit with Jorge A Celis DPM.  We are glad to serve you and happy to provide yo Take 20 mg by mouth as needed.    Commonly known as:  PRILOSEC           ondansetron 4 MG Tbdp   Commonly known as:  ZOFRAN-ODT           permethrin 5 % Crea   APPLY SCALP TO TOES LEAVE ON FOR 8-12 HOURS THEN [address] OFF   Commonly known as:  Felipe Loco

## (undated) NOTE — MR AVS SNAPSHOT
[facility] [address]  [phone]               Thank you for choosing us for your health care visit with Mil Ibarra DPM.  We are glad to serve you and happy to provide you with this summary of yo recent med list.                Alendronate Sodium 70 MG Tabs   Take 1 tablet (70 mg total) by mouth once a week.    Commonly known as:  FOSAMAX           HYDROcodone-acetaminophen 5-325 MG Tabs   Commonly known as:  NORCO           Ivermectin 3 MG Tabs   T Make half your plate fruits and vegetables Highly refined, white starches including white bread, rice and pasta   Eat plenty of protein, keep the fat content low Sugars:  sodas and sports drinks, candies and desserts   Eat plenty of low-fat dairy products

## (undated) NOTE — ED AVS SNAPSHOT
Mrs. Tevin Lang   MRN: [id number]    Department:  [facility] Emergency Department   Date of Visit:  11/21/2018           Disclosure     Insurance plans vary and the physician(s) referred by the ER may not be covered by your plan.  [place] within the next three months to obtain basic health screening including reassessment of your blood pressure.     IF THERE IS ANY CHANGE OR WORSENING OF YOUR CONDITION, CALL YOUR PRIMARY CARE PHYSICIAN AT ONCE OR RETURN IMMEDIATELY TO THE EMERGENCY DEPARTMEN

## (undated) NOTE — Clinical Note
4/25/2017      Dear Mac Forte. DO Yadi,    Thank you for allowing me to participate in your patient's care. We appreciate your confidence in their care for your patient.     The patient will find the results of our examination and our treatment recomme

## (undated) NOTE — ED AVS SNAPSHOT
[facility] Emergency Department    [address] [place] [address]    Phone:  [phone]    Fax:  [phone]           Mrs. Sun Dear   MRN: [id number]    Department:  [facility] Emergency Department   Date of take both doses of the new and old medication. ONLY take the dose prescribed today. tamsulosin HCl 0.4 MG Caps   Quantity:  7 capsule   Commonly known as:  FLOMAX   Take 1 capsule (0.4 mg total) by mouth daily. What changed:   This medication is al You were examined and treated today on an urgent basis only. This was not a substitute for ongoing medical care. Often, one Emergency Department visit does not uncover every injury or illness.  If you have been referred to a primary care or a specialist ph Prabhakar Barclay [address] [facility] ([address]) [address] [facility] [address] [place] [address]) [phone]   [facility] [address] [employer].  ([address] [facility]) [address]

## (undated) NOTE — LETTER
11/02/21        [facility] AT THE [place] [address]      Estimado [facility] THE [facility],    Nuestros registros indican que tiene análisis clínicos pendientes y/o pruebas que se ordenaron en price nombre que no se crowley completado.     Para

## (undated) NOTE — ED AVS SNAPSHOT
[facility] Emergency Department    [address] [place] [address]    Phone:  [phone]    Fax:  [phone]           Mrs. Simba Sidhu   MRN: [id number]    Department:  [facility] Emergency Department   Date of and Class Registration line at [phone] or find a doctor online by visiting [email or web address].    IF THERE IS ANY CHANGE OR WORSENING OF YOUR CONDITION, CALL YOUR PRIMARY CARE PHYSICIAN AT ONCE OR RETURN IMMEDIATELY TO [address] [place] [address].     If

## (undated) NOTE — MR AVS SNAPSHOT
[facility]  [address] [place] [address], [address]   Gina Show               Thank you for choosing us for your health care visit with Harrison Ann. DO Yadi.   We are glad to serve you and happy to provide you with this summary office. At that time, you will be provided with any authorization numbers or be assured that none are required. You can then schedule your appointment. Failure to obtain required authorization numbers can create reimbursement difficulties for you.     Assoc medications prescribed for you. Read the directions carefully, and ask your doctor or other care provider to review them with you.             Kaleigh                  Visit [facility] online at  [email or web address]

## (undated) NOTE — MR AVS SNAPSHOT
[place]  [address]  [phone]               Thank you for choosing us for your health care visit with Monet Gong MD.  We are glad to serve you and happy to provide you with this summary MEDICATED CHEST RUB 4.73-1.2-2.6 % Oint           NASONEX 50 MCG/ACT Susp   Generic drug:  Mometasone Furoate   1 spray bilat  nares bid           * omeprazole 2mg/ml Susp   Commonly known as:  PRILOSEC           * omeprazole 20 MG Cpdr   Take 20 mg by mo

## (undated) NOTE — LETTER
08/02/21    [place] [address]      Dear Sandra Pike,    Our records indicate that you have outstanding lab work and or testing that was ordered for you and has not yet been completed:  Orders Placed This Encounter